# Patient Record
Sex: MALE | Race: WHITE | URBAN - METROPOLITAN AREA
[De-identification: names, ages, dates, MRNs, and addresses within clinical notes are randomized per-mention and may not be internally consistent; named-entity substitution may affect disease eponyms.]

---

## 2017-05-15 ENCOUNTER — OFFICE VISIT (OUTPATIENT)
Dept: ENDOCRINOLOGY | Age: 62
End: 2017-05-15

## 2017-05-15 VITALS
HEART RATE: 79 BPM | SYSTOLIC BLOOD PRESSURE: 112 MMHG | RESPIRATION RATE: 16 BRPM | WEIGHT: 209.8 LBS | BODY MASS INDEX: 31.8 KG/M2 | TEMPERATURE: 97.2 F | DIASTOLIC BLOOD PRESSURE: 64 MMHG | HEIGHT: 68 IN

## 2017-05-15 DIAGNOSIS — R74.01 TRANSAMINITIS: ICD-10-CM

## 2017-05-15 DIAGNOSIS — E78.2 MIXED HYPERLIPIDEMIA: ICD-10-CM

## 2017-05-15 DIAGNOSIS — E03.9 HYPOTHYROIDISM, UNSPECIFIED TYPE: ICD-10-CM

## 2017-05-15 DIAGNOSIS — I10 ESSENTIAL HYPERTENSION: ICD-10-CM

## 2017-05-15 DIAGNOSIS — E11.65 TYPE 2 DIABETES MELLITUS WITH HYPERGLYCEMIA, WITHOUT LONG-TERM CURRENT USE OF INSULIN (HCC): Primary | ICD-10-CM

## 2017-05-15 DIAGNOSIS — E03.4 HYPOTHYROIDISM DUE TO ACQUIRED ATROPHY OF THYROID: ICD-10-CM

## 2017-05-15 DIAGNOSIS — E11.65 UNCONTROLLED TYPE 2 DIABETES MELLITUS WITH HYPERGLYCEMIA, WITHOUT LONG-TERM CURRENT USE OF INSULIN (HCC): ICD-10-CM

## 2017-05-15 RX ORDER — LEVOTHYROXINE SODIUM 200 UG/1
200 TABLET ORAL
Qty: 90 TAB | Refills: 3 | Status: SHIPPED | OUTPATIENT
Start: 2017-05-15 | End: 2017-11-15 | Stop reason: SDUPTHER

## 2017-05-15 RX ORDER — LANCETS
EACH MISCELLANEOUS
Qty: 300 EACH | Refills: 3 | Status: SHIPPED | OUTPATIENT
Start: 2017-05-15 | End: 2017-11-15 | Stop reason: SDUPTHER

## 2017-05-15 RX ORDER — METFORMIN HYDROCHLORIDE 1000 MG/1
1000 TABLET ORAL 2 TIMES DAILY WITH MEALS
Qty: 180 TAB | Refills: 3 | Status: SHIPPED | OUTPATIENT
Start: 2017-05-15 | End: 2017-11-15 | Stop reason: SDUPTHER

## 2017-05-15 RX ORDER — LISINOPRIL 20 MG/1
20 TABLET ORAL DAILY
Qty: 90 TAB | Refills: 3 | Status: SHIPPED | OUTPATIENT
Start: 2017-05-15 | End: 2017-11-15 | Stop reason: SDUPTHER

## 2017-05-15 NOTE — PROGRESS NOTES
[facility]               Tony Goldman MD        [address] 78 [phone] Fax [phone]  [id number]        [address] [facility] [address] YV:[phone] Fax [phone] ( Monday)          Patient Information  Date:5/15/2017  Name : Earl Curiel 65 y.o.     YOB: 1955         Referred by: Jacqui Sosa MD         Chief Complaint   Patient presents with    Diabetes    Thyroid Problem       History of Present Illness: Earl Curiel is a 65 y.o. male here for  Follow-up of  Type 2 Diabetes Mellitus. He was on sulfonylureas which was discontinued due to hypoglycemia. Reports hypoglycemia with Januvia and hence he is off of JAnuvia   onmetformin   LFts mildly abnormal   Lost weight   No issues   Reviewed the log  His BG are at goal           He was referred by Dr. Lashanda Villalobos for the management of type 2 diabetes. He was seen by Dr. Theodore Mensah in 2010 for hyperthyroidism, status post radioactive iodine therapy. He is currently hypothyroid on Levothyroxine replacement. No tingling, numbness. No calf claudication          Wt Readings from Last 3 Encounters:   05/15/17 209 lb 12.8 oz (95.2 kg)   11/14/16 211 lb (95.7 kg)   01/27/16 220 lb (99.8 kg)       BP Readings from Last 3 Encounters:   05/15/17 112/64   11/14/16 (!) 119/95   01/27/16 115/66           Past Medical History:   Diagnosis Date    Diabetes ([facility] [address]     Hyperlipidemia LDL goal < 100     Hyperthyroidism 20 yrs ago    treated with MANZANARES    Hypothyroidism     s/p MANZANARES     Current Outpatient Prescriptions   Medication Sig    metFORMIN (GLUCOPHAGE) 1,000 mg tablet Take 1 Tab by mouth two (2) times daily (with meals).  lisinopril (PRINIVIL, ZESTRIL) 20 mg tablet Take 1 Tab by mouth daily.  levothyroxine (SYNTHROID) 200 mcg tablet Take 1 Tab by mouth Daily (before breakfast).  Except Sunday    Lancets misc Use to test blood sugar 3 times daily Dx Code: E11.65    glucose blood VI test strips (ASCENSIA AUTODISC VI, ONE TOUCH ULTRA TEST VI) strip Use to test blood sugar 3 times daily Dx Code: E11.65    omega-3 fatty acids-vitamin e (FISH OIL) 1,000 mg cap Take 1 Cap by mouth two (2) times a day.  POTASSIUM (POTASSIMIN PO) Take  by mouth.  cholecalciferol, vitamin D3, (VITAMIN D3) 2,000 unit tab Take  by mouth.  aspirin (TONY CHILDRENS ASPIRIN) 81 mg chewable tablet Take 81 mg by mouth daily. Patient states three times per week     No current facility-administered medications for this visit. Allergies   Allergen Reactions    Glyburide Other (comments)     hypoglycemic    Crestor [Rosuvastatin] Nausea Only    Quinolones Other (comments)    Statins-Hmg-Coa Reductase Inhibitors Other (comments)    Neuromuscular Blockers, Steroidal Other (comments)     dizziness         Review of Systems:  - Constitutional Symptoms: no fevers, no chills  - Eyes: no blurry vision no double vision  - Cardiovascular: no chest pain ,no palpitations  - Respiratory: no cough no shortness of breath  - Gastrointestinal: no dysphagia no  abdominal pain  - Musculoskeletal: no joint pains no  weakness  - Integumentary: no rashes  -     Physical Examination:   Blood pressure 112/64, pulse 79, temperature 97.2 °F (36.2 °C), temperature source Oral, resp. rate 16, height 5' 8\" (1.727 m), weight 209 lb 12.8 oz (95.2 kg). Estimated body mass index is 31.9 kg/(m^2) as calculated from the following:    Height as of this encounter: 5' 8\" (1.727 m). -   Weight as of this encounter: 209 lb 12.8 oz (95.2 kg).   - General: pleasant, no distress, good eye contact  - HEENT: no pallor, no periorbital edema, EOMI  - Neck: supple, no thyromegaly  - Cardiovascular: regular, normal rate, normal S1 and S2  - Respiratory: clear to auscultation bilaterally  - Foot no ulcers , no callus , Xerosis  - Gastrointestinal: soft, nontender, nondistended,  BS +  - Musculoskeletal:  no edema, no foot ulcers  -       Data Reviewed:     [] Glucose records reviewed. [] See glucose records for details (to be scanned). [x] A1C  [x] Reviewed labs    Lab Results   Component Value Date/Time    Hemoglobin A1c 6.7 05/08/2017 08:16 AM    Hemoglobin A1c 6.5 08/15/2016 08:34 AM    Hemoglobin A1c 7.1 01/20/2016 08:14 AM    Glucose 118 05/08/2017 08:16 AM    Glucose POC 98 06/05/2014 09:35 AM    Microalb/Creat ratio (ug/mg creat.) 43.6 05/08/2017 08:16 AM    LDL, calculated 85 05/08/2017 08:16 AM    Creatinine 0.71 05/08/2017 08:16 AM      Lab Results   Component Value Date/Time    Cholesterol, total 148 05/08/2017 08:16 AM    HDL Cholesterol 47 05/08/2017 08:16 AM    LDL, calculated 85 05/08/2017 08:16 AM    Triglyceride 79 05/08/2017 08:16 AM     Lab Results   Component Value Date/Time    ALT (SGPT) 58 05/08/2017 08:16 AM    AST (SGOT) 23 05/08/2017 08:16 AM    Alk. phosphatase 80 05/08/2017 08:16 AM    Bilirubin, total 0.3 05/08/2017 08:16 AM     Lab Results   Component Value Date/Time    GFR est  05/08/2017 08:16 AM    GFR est non- 05/08/2017 08:16 AM    Creatinine 0.71 05/08/2017 08:16 AM    BUN 19 05/08/2017 08:16 AM    Sodium 138 05/08/2017 08:16 AM    Potassium 4.5 05/08/2017 08:16 AM    Chloride 102 05/08/2017 08:16 AM    CO2 22 05/08/2017 08:16 AM      Lab Results   Component Value Date/Time    TSH 0.438 05/08/2017 08:16 AM        Assessment/Plan:     1. Type 2 Diabetes Mellitus with no known complications  Lab Results   Component Value Date/Time    Hemoglobin A1c 6.7 05/08/2017 08:16 AM    Hemoglobin A1c (POC) 6.1 06/05/2014 09:35 AM   controlled   Continue Metformin  off januvia  FLU annually ,Pneumovax ,aspirin daily,annual eye exam,microalbumin    2. HTN : Continue current therapy . 3. Post ablative hypothyroidism-continue levothyroxine,none  on LT4 on Sunday     4. Obesity:Body mass index is 31.9 kg/(m^2).     5 Hyperlipidemia - diet controlled  Refused statin - understands the benefits      6 Transaminitis - NAFLD  Vit E  Losing weight       Thank you for allowing me to participate in the care of this patient.     Deana Howard MD

## 2017-05-15 NOTE — PROGRESS NOTES
Devonte Sen is a 65 y.o. male here for   Chief Complaint   Patient presents with    Diabetes    Thyroid Problem       Functional glucose monitor and record keeping system? - yes  Eye exam within last year? - yes Jan 2017  Foot exam within last year? - yes    Lab Results   Component Value Date/Time    Hemoglobin A1c 6.7 05/08/2017 08:16 AM    Hemoglobin A1c (POC) 6.1 06/05/2014 09:35 AM       Wt Readings from Last 3 Encounters:   11/14/16 211 lb (95.7 kg)   01/27/16 220 lb (99.8 kg)   09/30/15 216 lb 3.2 oz (98.1 kg)     Temp Readings from Last 3 Encounters:   11/14/16 96 °F (35.6 °C) (Oral)   09/30/15 97.6 °F (36.4 °C) (Oral)   05/13/15 97.6 °F (36.4 °C) (Oral)     BP Readings from Last 3 Encounters:   11/14/16 (!) 119/95   01/27/16 115/66   09/30/15 105/62     Pulse Readings from Last 3 Encounters:   11/14/16 73   01/27/16 76   09/30/15 66

## 2017-05-15 NOTE — MR AVS SNAPSHOT
Visit Information Date & Time Provider Department Dept. Phone Encounter #  
 5/15/2017  9:45 AM Amber Cee MD [facility] [phone] [id number] Follow-up Instructions Return in about 6 months (around 11/15/2017). Upcoming Health Maintenance Date Due Hepatitis C Screening 1955 FOOT EXAM Q1 4/29/1966 EYE EXAM RETINAL OR DILATED Q1 4/29/1966 Pneumococcal 19-64 Medium Risk (1 of 1 - PPSV23) 4/29/1975 DTaP/Tdap/Td series (1 - Tdap) 4/29/1977 FOBT Q 1 YEAR AGE 50-75 4/29/2006 ZOSTER VACCINE AGE 60> 4/29/2016 INFLUENZA AGE 9 TO ADULT 8/1/2017 HEMOGLOBIN A1C Q6M 11/8/2017 MICROALBUMIN Q1 5/8/2018 LIPID PANEL Q1 5/8/2018 Allergies as of 5/15/2017  Review Complete On: 5/15/2017 By: Amber Cee MD  
  
 Severity Noted Reaction Type Reactions Glyburide High 08/03/2010   Side Effect Other (comments)  
 hypoglycemic Crestor [Rosuvastatin] Medium 08/03/2010   Side Effect Nausea Only Quinolones Medium 08/03/2010   Side Effect Other (comments) Statins-hmg-coa Reductase Inhibitors Medium 08/03/2010   Side Effect Other (comments) Neuromuscular Blockers, Steroidal  08/03/2010   Side Effect Other (comments)  
 dizziness Current Immunizations  Never Reviewed No immunizations on file. Not reviewed this visit You Were Diagnosed With   
  
 Codes Comments Type 2 diabetes mellitus with hyperglycemia, without long-term current use of insulin (HCC)    -  Primary ICD-10-CM: E11.65 ICD-9-CM: 250.00, 790.29 Hypothyroidism due to acquired atrophy of thyroid     ICD-10-CM: E03.4 ICD-9-CM: 244.8, 246.8 Vitals BP Pulse Temp Resp Height(growth percentile) Weight(growth percentile) 112/64 (BP 1 Location: Right arm, BP Patient Position: Sitting) 79 97.2 °F (36.2 °C) (Oral) 16 5' 8\" (1.727 m) 209 lb 12.8 oz (95.2 kg) BMI  
  
  
  
  
 31.9 kg/m2 Vitals History BMI and BSA Data Body Mass Index Body Surface Area 31.9 kg/m 2 2.14 m 2 Preferred Pharmacy Pharmacy Name Phone [facility] [address] Your Updated Medication List  
  
   
This list is accurate as of: 5/15/17 10:58 AM.  Always use your most recent med list.  
  
  
  
  
 TONY CHILDRENS ASPIRIN 81 mg chewable tablet Generic drug:  aspirin Take 81 mg by mouth daily. Patient states three times per week FISH OIL 1,000 mg Cap Generic drug:  omega-3 fatty acids-vitamin e Take 1 Cap by mouth two (2) times a day. glucose blood VI test strips strip Commonly known as:  ASCENSIA AUTODISC VI, ONE TOUCH ULTRA TEST VI  
Use to test blood sugar 3 times daily Dx Code: E11.65 Lancets Misc Use to test blood sugar 3 times daily Dx Code: E11.65  
  
 levothyroxine 200 mcg tablet Commonly known as:  SYNTHROID Take 1 Tab by mouth Daily (before breakfast). Except Sunday  
  
 lisinopril 20 mg tablet Commonly known as:  Dorean Peeks Take 1 Tab by mouth daily. metFORMIN 1,000 mg tablet Commonly known as:  GLUCOPHAGE Take 1 Tab by mouth two (2) times daily (with meals). POTASSIMIN PO Take  by mouth. VITAMIN D3 2,000 unit Tab Generic drug:  cholecalciferol (vitamin D3) Take  by mouth. Follow-up Instructions Return in about 6 months (around 11/15/2017). Introducing [facility]! Tesha Ram introduces [employer] patient portal. Now you can access parts of your medical record, email your doctor's office, and request medication refills online. 1. In your internet browser, go to [email or web address] 2. Click on the First Time User? Click Here link in the Sign In box. You will see the New Member Sign Up page. 3. Enter your [employer] Access Code exactly as it appears below. You will not need to use this code after youve completed the sign-up process.  If you do not sign up before the expiration date, you must request a new code. · [employer] Access Code: [email or web address] Expires: 8/13/2017 10:58 AM 
 
4. Enter the last four digits of your Social Security Number (xxxx) and Date of Birth (mm/dd/yyyy) as indicated and click Submit. You will be taken to the next sign-up page. 5. Create a Event[employer]q ID. This will be your Event[employer] login ID and cannot be changed, so think of one that is secure and easy to remember. 6. Create a [employer] password. You can change your password at any time. 7. Enter your Password Reset Question and Answer. This can be used at a later time if you forget your password. 8. Enter your e-mail address. You will receive e-mail notification when new information is available in [address] 9. Click Sign Up. You can now view and download portions of your medical record. 10. Click the Download Summary menu link to download a portable copy of your medical information. If you have questions, please visit the Frequently Asked Questions section of the [employer] website. Remember, [employer] is NOT to be used for urgent needs. For medical emergencies, dial 911. Now available from your iPhone and Android! Please provide this summary of care documentation to your next provider. Your primary care clinician is listed as Michael Wright. If you have any questions after today's visit, please call [phone].

## 2017-08-18 DIAGNOSIS — E11.65 TYPE 2 DIABETES MELLITUS WITH HYPERGLYCEMIA, UNSPECIFIED LONG TERM INSULIN USE STATUS: Primary | ICD-10-CM

## 2017-08-18 NOTE — TELEPHONE ENCOUNTER
Pt called stating that his sugars are running around 130+ in the morning when fasting and over 200 after eating. He says it usually runs only about 120 or lower fasting and about 150 after he eats breakfast. He wants to know if anything needs to be done or if he can be put on another rx.

## 2017-08-18 NOTE — TELEPHONE ENCOUNTER
Pt states he has not been sick, had an infection or had any steroids. Informed pt he needs Januvia 100 mg every AM. Pt states send rx to [facility].

## 2017-11-15 ENCOUNTER — OFFICE VISIT (OUTPATIENT)
Dept: ENDOCRINOLOGY | Age: 62
End: 2017-11-15

## 2017-11-15 VITALS
BODY MASS INDEX: 32.74 KG/M2 | DIASTOLIC BLOOD PRESSURE: 61 MMHG | HEART RATE: 77 BPM | RESPIRATION RATE: 16 BRPM | SYSTOLIC BLOOD PRESSURE: 134 MMHG | OXYGEN SATURATION: 96 % | HEIGHT: 68 IN | WEIGHT: 216 LBS | TEMPERATURE: 96.2 F

## 2017-11-15 DIAGNOSIS — E11.65 UNCONTROLLED TYPE 2 DIABETES MELLITUS WITH HYPERGLYCEMIA, WITHOUT LONG-TERM CURRENT USE OF INSULIN (HCC): ICD-10-CM

## 2017-11-15 DIAGNOSIS — I10 ESSENTIAL HYPERTENSION: ICD-10-CM

## 2017-11-15 DIAGNOSIS — E11.65 TYPE 2 DIABETES MELLITUS WITH HYPERGLYCEMIA, UNSPECIFIED LONG TERM INSULIN USE STATUS: ICD-10-CM

## 2017-11-15 DIAGNOSIS — E03.9 HYPOTHYROIDISM, UNSPECIFIED TYPE: ICD-10-CM

## 2017-11-15 DIAGNOSIS — E11.65 TYPE 2 DIABETES MELLITUS WITH HYPERGLYCEMIA, WITHOUT LONG-TERM CURRENT USE OF INSULIN (HCC): Primary | ICD-10-CM

## 2017-11-15 DIAGNOSIS — E78.2 MIXED HYPERLIPIDEMIA: ICD-10-CM

## 2017-11-15 DIAGNOSIS — E03.4 HYPOTHYROIDISM DUE TO ACQUIRED ATROPHY OF THYROID: ICD-10-CM

## 2017-11-15 RX ORDER — METFORMIN HYDROCHLORIDE 1000 MG/1
1000 TABLET ORAL 2 TIMES DAILY WITH MEALS
Qty: 180 TAB | Refills: 3 | Status: SHIPPED | OUTPATIENT
Start: 2017-11-15 | End: 2018-05-09 | Stop reason: SDUPTHER

## 2017-11-15 RX ORDER — LISINOPRIL 20 MG/1
20 TABLET ORAL DAILY
Qty: 90 TAB | Refills: 3 | Status: SHIPPED | OUTPATIENT
Start: 2017-11-15 | End: 2018-05-09 | Stop reason: SDUPTHER

## 2017-11-15 RX ORDER — LANCETS
EACH MISCELLANEOUS
Qty: 300 EACH | Refills: 3 | Status: SHIPPED | OUTPATIENT
Start: 2017-11-15 | End: 2018-05-09 | Stop reason: SDUPTHER

## 2017-11-15 RX ORDER — VITAMIN E CAP 100 UNIT 100 UNIT
100 CAP ORAL EVERY OTHER DAY
COMMUNITY
End: 2021-08-23

## 2017-11-15 RX ORDER — LEVOTHYROXINE SODIUM 200 UG/1
200 TABLET ORAL
Qty: 90 TAB | Refills: 3 | Status: SHIPPED | OUTPATIENT
Start: 2017-11-15 | End: 2018-05-09 | Stop reason: SDUPTHER

## 2017-11-15 NOTE — MR AVS SNAPSHOT
Visit Information Date & Time Provider Department Dept. Phone Encounter #  
 11/15/2017  9:30 AM Dalia Nash MD [facility] [phone] [id number] Follow-up Instructions Return in about 6 months (around 5/15/2018). Upcoming Health Maintenance Date Due  
 FOOT EXAM Q1 4/29/1966 EYE EXAM RETINAL OR DILATED Q1 4/29/1966 Pneumococcal 19-64 Medium Risk (1 of 1 - PPSV23) 4/29/1975 DTaP/Tdap/Td series (1 - Tdap) 4/29/1977 FOBT Q 1 YEAR AGE 50-75 4/29/2006 ZOSTER VACCINE AGE 60> 2/29/2016 Influenza Age 5 to Adult 8/1/2017 HEMOGLOBIN A1C Q6M 5/8/2018 MICROALBUMIN Q1 5/8/2018 LIPID PANEL Q1 5/8/2018 Allergies as of 11/15/2017  Review Complete On: 5/15/2017 By: Dalia Nash MD  
  
 Severity Noted Reaction Type Reactions Glyburide High 08/03/2010   Side Effect Other (comments)  
 hypoglycemic Crestor [Rosuvastatin] Medium 08/03/2010   Side Effect Nausea Only Quinolones Medium 08/03/2010   Side Effect Other (comments) Statins-hmg-coa Reductase Inhibitors Medium 08/03/2010   Side Effect Other (comments) Neuromuscular Blockers, Steroidal  08/03/2010   Side Effect Other (comments)  
 dizziness Current Immunizations  Never Reviewed No immunizations on file. Not reviewed this visit You Were Diagnosed With   
  
 Codes Comments Type 2 diabetes mellitus with hyperglycemia, without long-term current use of insulin (HCC)    -  Primary ICD-10-CM: E11.65 ICD-9-CM: 250.00, 790.29 Mixed hyperlipidemia     ICD-10-CM: E78.2 ICD-9-CM: 272.2 Hypothyroidism due to acquired atrophy of thyroid     ICD-10-CM: E03.4 ICD-9-CM: 244.8, 246.8 Vitals BP Pulse Temp Resp Height(growth percentile) Weight(growth percentile) 134/61 (BP 1 Location: Right arm, BP Patient Position: Sitting) 77 96.2 °F (35.7 °C) (Oral) 16 5' 8\" (1.727 m) 216 lb (98 kg) SpO2 BMI 96% 32.84 kg/m2 Vitals History BMI and BSA Data Body Mass Index Body Surface Area  
 32.84 kg/m 2 2.17 m 2 Preferred Pharmacy Pharmacy Name Phone [facility] [address] Your Updated Medication List  
  
   
This list is accurate as of: 11/15/17  9:59 AM.  Always use your most recent med list.  
  
  
  
  
 TONY CHILDRENS ASPIRIN 81 mg chewable tablet Generic drug:  aspirin Take 81 mg by mouth daily. Patient states three times per week FISH OIL 1,000 mg Cap Generic drug:  omega-3 fatty acids-vitamin e Take 1 Cap by mouth two (2) times a day. glucose blood VI test strips strip Commonly known as:  ASCENSIA AUTODISC VI, ONE TOUCH ULTRA TEST VI  
Use to test blood sugar 3 times daily Dx Code: E11.65 Lancets Misc Use to test blood sugar 3 times daily Dx Code: E11.65  
  
 levothyroxine 200 mcg tablet Commonly known as:  SYNTHROID Take 1 Tab by mouth Daily (before breakfast). Except Sunday  
  
 lisinopril 20 mg tablet Commonly known as:  Lorena Maddox Take 1 Tab by mouth daily. metFORMIN 1,000 mg tablet Commonly known as:  GLUCOPHAGE Take 1 Tab by mouth two (2) times daily (with meals). POTASSIMIN PO Take  by mouth. SITagliptin 100 mg tablet Commonly known as:  Irene Sine Take 1 Tab by mouth every morning. VITAMIN D3 2,000 unit Tab Generic drug:  cholecalciferol (vitamin D3) Take  by mouth.  
  
 vitamin e 100 unit capsule Commonly known as:  E GEMS Take 100 Units by mouth every other day. Follow-up Instructions Return in about 6 months (around 5/15/2018). Introducing [facility]! Salina Bueno introduces [facility] [employer] patient portal. Now you can access parts of your medical record, email your doctor's office, and request medication refills online. 1. In your internet browser, go to [email or web address] 2. Click on the First Time User? Click Here link in the Sign In box. You will see the New Member Sign Up page. 3. Enter your [email or web address] Access Code exactly as it appears below. You will not need to use this code after youve completed the sign-up process. If you do not sign up before the expiration date, you must request a new code. · [email or web address] Access Code: [email or web address] Expires: 2/13/2018  9:59 AM 
 
4. Enter the last four digits of your Social Security Number (xxxx) and Date of Birth (mm/dd/yyyy) as indicated and click Submit. You will be taken to the next sign-up page. 5. Create a [email or web address] ID. This will be your [email or web address] login ID and cannot be changed, so think of one that is secure and easy to remember. 6. Create a [email or web address] password. You can change your password at any time. 7. Enter your Password Reset Question and Answer. This can be used at a later time if you forget your password. 8. Enter your e-mail address. You will receive e-mail notification when new information is available in [address] 9. Click Sign Up. You can now view and download portions of your medical record. 10. Click the Download Summary menu link to download a portable copy of your medical information. If you have questions, please visit the Frequently Asked Questions section of the [email or web address] website. Remember, [email or web address] is NOT to be used for urgent needs. For medical emergencies, dial 911. Now available from your iPhone and Android! Please provide this summary of care documentation to your next provider. Your primary care clinician is listed as Patricio Sanchez. If you have any questions after today's visit, please call [phone].

## 2017-11-15 NOTE — PROGRESS NOTES
Chintan Guallpa is a 65 y.o. male here for   Chief Complaint   Patient presents with    Diabetes    Thyroid Problem       Functional glucose monitor and record keeping system? -yes   Eye exam within last year? - Jan 2017  Foot exam within last year? - yes    1. Have you been to the ER, urgent care clinic since your last visit? Hospitalized since your last visit? -no    2. Have you seen or consulted any other health care providers outside of the [facility] since your last visit? Include any pap smears or colon screening. -PCP      Lab Results   Component Value Date/Time    Hemoglobin A1c 6.5 11/08/2017 08:07 AM    Hemoglobin A1c (POC) 6.1 06/05/2014 09:35 AM       Wt Readings from Last 3 Encounters:   05/15/17 209 lb 12.8 oz (95.2 kg)   11/14/16 211 lb (95.7 kg)   01/27/16 220 lb (99.8 kg)     Temp Readings from Last 3 Encounters:   05/15/17 97.2 °F (36.2 °C) (Oral)   11/14/16 96 °F (35.6 °C) (Oral)   09/30/15 97.6 °F (36.4 °C) (Oral)     BP Readings from Last 3 Encounters:   05/15/17 112/64   11/14/16 (!) 119/95   01/27/16 115/66     Pulse Readings from Last 3 Encounters:   05/15/17 79   11/14/16 73   01/27/16 76

## 2017-11-15 NOTE — PROGRESS NOTES
Jer Concepcion MD          Patient Information  Date:11/15/2017  Name : Lilian Vaughna 65 y.o.     YOB: 1955         Referred by: Feroz Camarena MD         Chief Complaint   Patient presents with    Diabetes    Thyroid Problem       History of Present Illness: Lilian Vaughan is a 65 y.o. male here for  Follow-up of  Type 2 Diabetes Mellitus. He was on sulfonylureas which was discontinued due to hypoglycemia. on januvia and metformin   LFts mildly abnormal     Reviewed the log  His BG are at goal           He was referred by Dr. Karsten Knight for the management of type 2 diabetes. He was seen by Dr. Shar Goetz in 2010 for hyperthyroidism, status post radioactive iodine therapy. He is currently hypothyroid on Levothyroxine replacement. No tingling, numbness. No calf claudication          Wt Readings from Last 3 Encounters:   11/15/17 216 lb (98 kg)   05/15/17 209 lb 12.8 oz (95.2 kg)   11/14/16 211 lb (95.7 kg)       BP Readings from Last 3 Encounters:   11/15/17 134/61   05/15/17 112/64   11/14/16 (!) 119/95           Past Medical History:   Diagnosis Date    [facility]     Hyperlipidemia LDL goal < 100     Hyperthyroidism 20 yrs ago    treated with MANZANARES    Hypothyroidism     s/p MANZANARES     Current Outpatient Prescriptions   Medication Sig    vitamin e (E GEMS) 100 unit capsule Take 100 Units by mouth every other day.  SITagliptin (JANUVIA) 100 mg tablet Take 1 Tab by mouth every morning.  metFORMIN (GLUCOPHAGE) 1,000 mg tablet Take 1 Tab by mouth two (2) times daily (with meals).  lisinopril (PRINIVIL, ZESTRIL) 20 mg tablet Take 1 Tab by mouth daily.  levothyroxine (SYNTHROID) 200 mcg tablet Take 1 Tab by mouth Daily (before breakfast).  Except Sunday    Lancets misc Use to test blood sugar 3 times daily Dx Code: E11.65    glucose blood VI test strips (ASCENSIA AUTODISC VI, ONE TOUCH ULTRA TEST VI) strip Use to test blood sugar 3 times daily Dx Code: E11.65    omega-3 fatty acids-vitamin e (FISH OIL) 1,000 mg cap Take 1 Cap by mouth two (2) times a day.  POTASSIUM (POTASSIMIN PO) Take  by mouth.  cholecalciferol, vitamin D3, (VITAMIN D3) 2,000 unit tab Take  by mouth.  aspirin (TONY CHILDRENS ASPIRIN) 81 mg chewable tablet Take 81 mg by mouth daily. Patient states three times per week     No current facility-administered medications for this visit. Allergies   Allergen Reactions    Glyburide Other (comments)     hypoglycemic    Crestor [Rosuvastatin] Nausea Only    Quinolones Other (comments)    Statins-Hmg-Coa Reductase Inhibitors Other (comments)    Neuromuscular Blockers, Steroidal Other (comments)     dizziness         Review of Systems:  - Constitutional Symptoms: no fevers, no chills  - Eyes: no blurry vision no double vision  - Cardiovascular: no chest pain ,no palpitations  - Respiratory: no cough no shortness of breath  - Gastrointestinal: no dysphagia no  abdominal pain  - Musculoskeletal: no joint pains no  weakness  - Integumentary: no rashes  -     Physical Examination:   Blood pressure 134/61, pulse 77, temperature 96.2 °F (35.7 °C), temperature source Oral, resp. rate 16, height 5' 8\" (1.727 m), weight 216 lb (98 kg), SpO2 96 %. Estimated body mass index is 32.84 kg/(m^2) as calculated from the following:    Height as of this encounter: 5' 8\" (1.727 m). -   Weight as of this encounter: 216 lb (98 kg). - General: pleasant, no distress, good eye contact  - HEENT: no pallor, no periorbital edema, EOMI  - Neck: supple, no thyromegaly  - Cardiovascular: regular, normal rate, normal S1 and S2, murmur  - Respiratory: clear to auscultation bilaterally  - Foot no ulcers , no callus , Xerosis  - Gastrointestinal: soft, nontender, nondistended,  BS +  - Musculoskeletal:  no edema, no foot ulcers  -       Data Reviewed:     [] Glucose records reviewed.   [] See glucose records for details (to be scanned). [x] A1C  [x] Reviewed labs    Lab Results   Component Value Date/Time    Hemoglobin A1c 6.5 11/08/2017 08:07 AM    Hemoglobin A1c 6.7 05/08/2017 08:16 AM    Hemoglobin A1c 6.5 08/15/2016 08:34 AM    Glucose 126 11/08/2017 08:07 AM    Glucose POC 98 06/05/2014 09:35 AM    Microalb/Creat ratio (ug/mg creat.) 43.6 05/08/2017 08:16 AM    LDL, calculated 85 05/08/2017 08:16 AM    Creatinine 0.69 11/08/2017 08:07 AM      Lab Results   Component Value Date/Time    Cholesterol, total 148 05/08/2017 08:16 AM    HDL Cholesterol 47 05/08/2017 08:16 AM    LDL, calculated 85 05/08/2017 08:16 AM    Triglyceride 79 05/08/2017 08:16 AM     Lab Results   Component Value Date/Time    ALT (SGPT) 54 11/08/2017 08:07 AM    AST (SGOT) 32 11/08/2017 08:07 AM    Alk. phosphatase 86 11/08/2017 08:07 AM    Bilirubin, total 0.2 11/08/2017 08:07 AM     Lab Results   Component Value Date/Time    GFR est  11/08/2017 08:07 AM    GFR est non- 11/08/2017 08:07 AM    Creatinine 0.69 11/08/2017 08:07 AM    BUN 13 11/08/2017 08:07 AM    Sodium 139 11/08/2017 08:07 AM    Potassium 5.1 11/08/2017 08:07 AM    Chloride 98 11/08/2017 08:07 AM    CO2 28 11/08/2017 08:07 AM      Lab Results   Component Value Date/Time    TSH 1.030 11/08/2017 08:07 AM        Assessment/Plan:     1. Type 2 Diabetes Mellitus with no known complications  Lab Results   Component Value Date/Time    Hemoglobin A1c 6.5 11/08/2017 08:07 AM    Hemoglobin A1c (POC) 6.1 06/05/2014 09:35 AM   controlled   Continue Metformin  on januvia  FLU annually ,Pneumovax ,aspirin daily,annual eye exam,microalbumin    2. HTN : Continue current therapy . 3. Post ablative hypothyroidism-continue levothyroxine,none  on LT4 on Sunday     4. Obesity:Body mass index is 32.84 kg/(m^2).     5 Hyperlipidemia - diet controlled  Refused statin - understands the benefits      6 Transaminitis - NAFLD  Vit E  Hepatitis panel negative      Thank you for allowing me to participate in the care of this patient.     Марина Otero MD

## 2018-04-11 ENCOUNTER — TELEPHONE (OUTPATIENT)
Dept: ENDOCRINOLOGY | Age: 63
End: 2018-04-11

## 2018-04-11 NOTE — TELEPHONE ENCOUNTER
Patient came by office and was given a new Januvia savings card. Expiration date is 2019. Informed patient to activate card before taking it to the pharmacy. Verbalized understanding.

## 2018-05-09 ENCOUNTER — OFFICE VISIT (OUTPATIENT)
Dept: ENDOCRINOLOGY | Age: 63
End: 2018-05-09

## 2018-05-09 VITALS
HEIGHT: 68 IN | RESPIRATION RATE: 16 BRPM | HEART RATE: 65 BPM | SYSTOLIC BLOOD PRESSURE: 117 MMHG | BODY MASS INDEX: 31.75 KG/M2 | DIASTOLIC BLOOD PRESSURE: 62 MMHG | OXYGEN SATURATION: 98 % | WEIGHT: 209.5 LBS | TEMPERATURE: 96.3 F

## 2018-05-09 DIAGNOSIS — E78.2 MIXED HYPERLIPIDEMIA: ICD-10-CM

## 2018-05-09 DIAGNOSIS — I10 ESSENTIAL HYPERTENSION: ICD-10-CM

## 2018-05-09 DIAGNOSIS — E11.65 TYPE 2 DIABETES MELLITUS WITH HYPERGLYCEMIA, WITHOUT LONG-TERM CURRENT USE OF INSULIN (HCC): Primary | ICD-10-CM

## 2018-05-09 DIAGNOSIS — E11.65 UNCONTROLLED TYPE 2 DIABETES MELLITUS WITH HYPERGLYCEMIA, WITHOUT LONG-TERM CURRENT USE OF INSULIN (HCC): ICD-10-CM

## 2018-05-09 DIAGNOSIS — E03.9 HYPOTHYROIDISM, UNSPECIFIED TYPE: ICD-10-CM

## 2018-05-09 DIAGNOSIS — E03.4 HYPOTHYROIDISM DUE TO ACQUIRED ATROPHY OF THYROID: ICD-10-CM

## 2018-05-09 PROBLEM — E11.21 TYPE 2 DIABETES WITH NEPHROPATHY (HCC): Status: ACTIVE | Noted: 2018-05-09

## 2018-05-09 RX ORDER — LANCETS
EACH MISCELLANEOUS
Qty: 300 EACH | Refills: 3 | Status: SHIPPED | OUTPATIENT
Start: 2018-05-09 | End: 2018-11-13 | Stop reason: SDUPTHER

## 2018-05-09 RX ORDER — LEVOTHYROXINE SODIUM 200 UG/1
200 TABLET ORAL
Qty: 90 TAB | Refills: 3 | Status: SHIPPED | OUTPATIENT
Start: 2018-05-09 | End: 2018-11-13 | Stop reason: SDUPTHER

## 2018-05-09 RX ORDER — METFORMIN HYDROCHLORIDE 1000 MG/1
1000 TABLET ORAL 2 TIMES DAILY WITH MEALS
Qty: 180 TAB | Refills: 3 | Status: SHIPPED | OUTPATIENT
Start: 2018-05-09 | End: 2018-11-13 | Stop reason: SDUPTHER

## 2018-05-09 RX ORDER — LISINOPRIL 20 MG/1
20 TABLET ORAL DAILY
Qty: 90 TAB | Refills: 3 | Status: SHIPPED | OUTPATIENT
Start: 2018-05-09 | End: 2018-11-13 | Stop reason: SDUPTHER

## 2018-05-09 NOTE — PROGRESS NOTES
Kaity Cruz MD          Patient Information  Date:5/9/2018  Name : Boyd Zarate 59 y.o.     YOB: 1955         Referred by: Jaylene Rubio MD         Chief Complaint   Patient presents with    Diabetes    Thyroid Problem       History of Present Illness: Boyd Zarate is a 59 y.o. male here for  Follow-up of  Type 2 Diabetes Mellitus. DM dx 2006   He was on sulfonylureas which was discontinued due to hypoglycemia. on januvia and metformin   LFts mildly abnormal     Checking glucose at home  Has not noticed significant hypoglycemia     Compliant with medications    No acute issues            He was referred by Dr. Jolly Hammond for the management of type 2 diabetes. He was seen by Dr. Shawn Terry in 2010 for hyperthyroidism, status post radioactive iodine therapy. He is currently hypothyroid on Levothyroxine replacement. No tingling, numbness. No calf claudication          Wt Readings from Last 3 Encounters:   05/09/18 209 lb 8 oz (95 kg)   11/15/17 216 lb (98 kg)   05/15/17 209 lb 12.8 oz (95.2 kg)       BP Readings from Last 3 Encounters:   05/09/18 117/62   11/15/17 134/61   05/15/17 112/64           Past Medical History:   Diagnosis Date    [facility]     Hyperlipidemia LDL goal < 100     Hyperthyroidism 20 yrs ago    treated with MANZANARES    Hypothyroidism     s/p MANZANARES     Current Outpatient Prescriptions   Medication Sig    vitamin e (E GEMS) 100 unit capsule Take 100 Units by mouth every other day.  SITagliptin (JANUVIA) 100 mg tablet Take 1 Tab by mouth every morning.  metFORMIN (GLUCOPHAGE) 1,000 mg tablet Take 1 Tab by mouth two (2) times daily (with meals).  lisinopril (PRINIVIL, ZESTRIL) 20 mg tablet Take 1 Tab by mouth daily.  levothyroxine (SYNTHROID) 200 mcg tablet Take 1 Tab by mouth Daily (before breakfast).  Except Sunday    glucose blood VI test strips (ASCENSIA AUTODISC VI, ONE TOUCH ULTRA TEST VI) strip Use to test blood sugar 3 times daily Dx Code: E11.65    Lancets misc Use to test blood sugar 3 times daily Dx Code: E11.65    omega-3 fatty acids-vitamin e (FISH OIL) 1,000 mg cap Take 1 Cap by mouth two (2) times a day.  POTASSIUM (POTASSIMIN PO) Take  by mouth.  cholecalciferol, vitamin D3, (VITAMIN D3) 2,000 unit tab Take  by mouth.  aspirin (TONY CHILDRENS ASPIRIN) 81 mg chewable tablet Take 81 mg by mouth daily. Patient states three times per week     No current facility-administered medications for this visit. Allergies   Allergen Reactions    Glyburide Other (comments)     hypoglycemic    Crestor [Rosuvastatin] Nausea Only    Quinolones Other (comments)    Statins-Hmg-Coa Reductase Inhibitors Other (comments)    Neuromuscular Blockers, Steroidal Other (comments)     dizziness         Review of Systems:  - Constitutional Symptoms: no fevers, no chills  - Eyes: no blurry vision no double vision  - Cardiovascular: no chest pain ,no palpitations  - Respiratory: no cough no shortness of breath  - Gastrointestinal: no dysphagia no  abdominal pain  - Musculoskeletal: no joint pains no  weakness  - Integumentary: no rashes  -     Physical Examination:   Blood pressure 117/62, pulse 65, temperature 96.3 °F (35.7 °C), temperature source Oral, resp. rate 16, height 5' 8\" (1.727 m), weight 209 lb 8 oz (95 kg), SpO2 98 %. Estimated body mass index is 31.85 kg/(m^2) as calculated from the following:    Height as of this encounter: 5' 8\" (1.727 m). -   Weight as of this encounter: 209 lb 8 oz (95 kg).   - General: pleasant, no distress, good eye contact  - HEENT: no pallor, no periorbital edema, EOMI  - Neck: supple, no thyromegaly  - Cardiovascular: regular, normal rate, normal S1 and S2, murmur  - Respiratory: clear to auscultation bilaterally  - Gastrointestinal: soft, nontender, nondistended,  BS +  - Musculoskeletal:  no edema,  -   Diabetic foot exam: May 2018    Left: Vibratory sensation normal    Filament test normal sensation with micro filament   Pulse DP: 1+    Deformities: None  Right:    Vibratory sensation normal   Filament test normal sensation with micro filament   Pulse DP: 1+   Deformities: None      Data Reviewed:     [] Glucose records reviewed. [] See glucose records for details (to be scanned). [x] A1C  [x] Reviewed labs    Lab Results   Component Value Date/Time    Hemoglobin A1c 6.4 (H) 05/02/2018 08:24 AM    Hemoglobin A1c 6.5 (H) 11/08/2017 08:07 AM    Hemoglobin A1c 6.7 (H) 05/08/2017 08:16 AM    Glucose 135 (H) 05/02/2018 08:24 AM    Glucose POC 98 06/05/2014 09:35 AM    Microalb/Creat ratio (ug/mg creat.) 50.4 (H) 05/02/2018 08:24 AM    LDL,Direct 97 05/02/2018 08:24 AM    LDL, calculated 85 05/08/2017 08:16 AM    Creatinine 0.67 (L) 05/02/2018 08:24 AM      Lab Results   Component Value Date/Time    Cholesterol, total 148 05/08/2017 08:16 AM    HDL Cholesterol 47 05/08/2017 08:16 AM    LDL,Direct 97 05/02/2018 08:24 AM    LDL, calculated 85 05/08/2017 08:16 AM    Triglyceride 79 05/08/2017 08:16 AM     Lab Results   Component Value Date/Time    ALT (SGPT) 47 (H) 05/02/2018 08:24 AM    AST (SGOT) 22 05/02/2018 08:24 AM    Alk. phosphatase 81 05/02/2018 08:24 AM    Bilirubin, total 0.3 05/02/2018 08:24 AM     Lab Results   Component Value Date/Time    GFR est  05/02/2018 08:24 AM    GFR est non- 05/02/2018 08:24 AM    Creatinine 0.67 (L) 05/02/2018 08:24 AM    BUN 19 05/02/2018 08:24 AM    Sodium 138 05/02/2018 08:24 AM    Potassium 5.0 05/02/2018 08:24 AM    Chloride 100 05/02/2018 08:24 AM    CO2 21 05/02/2018 08:24 AM      Lab Results   Component Value Date/Time    TSH 1.030 11/08/2017 08:07 AM        Assessment/Plan:     1.  Type 2 Diabetes Mellitus with no known complications  Lab Results   Component Value Date/Time    Hemoglobin A1c 6.4 (H) 05/02/2018 08:24 AM    Hemoglobin A1c (POC) 6.1 06/05/2014 09:35 AM   controlled   Continue Metformin  on januvia  FLU annually ,Pneumovax ,aspirin daily,annual eye exam,microalbumin    2. HTN : Continue current therapy . 3. Post ablative hypothyroidism-continue levothyroxine,none  on LT4 on Sunday     4. Obesity:Body mass index is 31.85 kg/(m^2). 5 Hyperlipidemia - diet controlled  Refused statin - understands the benefits      6 Transaminitis - NAFLD  Vit E  Hepatitis panel negative      Thank you for allowing me to participate in the care of this patient.     Yaya Almonte MD

## 2018-05-09 NOTE — PATIENT INSTRUCTIONS
Please remember to bring your meter and/or log to every visit. Also, be sure to have a dilated diabetic eye exam done annually. Refills -Please call your pharmacy and have them send us a refill request.  Results - Allow up to a week for lab results to be processed and reviewed. Phone calls - Allow up to 24 hours for non-urgent calls to be returned. Prior Authorization - May take up to 2 weeks to process depending on your insurance. Forms - FMLA, DMV, Patient Assistance, etc. will take up to 2 weeks to process. Cancellations - Please notify the office in advance if you cannot keep your appointment. Samples - Will only be dispensed at visits as supplies are limited. If you are having a medical emergency, call 911.

## 2018-05-09 NOTE — LETTER
5/10/2018 10:57 AM 
 
Patient:  Suzanne Thorne YOB: 1955 Date of Visit: 5/9/2018 Dear Inessa Jackson MD 
[address] [place] [address] VIA Facsimile: [phone] 
 : Thank you for referring Mr. Keshav Mckeon to me for evaluation/treatment. Below are the relevant portions of my assessment and plan of care. If you have questions, please do not hesitate to call me. I look forward to following Mr. Purvi Hall along with you. Sincerely, Devonte Little MD

## 2018-05-09 NOTE — PROGRESS NOTES
Tierra Bryson is a 59 y.o. male here for   Chief Complaint   Patient presents with    Diabetes    Thyroid Problem       Functional glucose monitor and record keeping system? - yes  Eye exam within last year? - YOVANA Alexander exam within last year? - due    1. Have you been to the ER, urgent care clinic since your last visit? Hospitalized since your last visit? -no  2. Have you seen or consulted any other health care providers outside of the [address] [place] [address] since your last visit?   Include any pap smears or colon screening.-no      Lab Results   Component Value Date/Time    Hemoglobin A1c 6.4 (H) 05/02/2018 08:24 AM    Hemoglobin A1c (POC) 6.1 06/05/2014 09:35 AM       Wt Readings from Last 3 Encounters:   11/15/17 216 lb (98 kg)   05/15/17 209 lb 12.8 oz (95.2 kg)   11/14/16 211 lb (95.7 kg)     Temp Readings from Last 3 Encounters:   11/15/17 96.2 °F (35.7 °C) (Oral)   05/15/17 97.2 °F (36.2 °C) (Oral)   11/14/16 96 °F (35.6 °C) (Oral)     BP Readings from Last 3 Encounters:   11/15/17 134/61   05/15/17 112/64   11/14/16 (!) 119/95     Pulse Readings from Last 3 Encounters:   11/15/17 77   05/15/17 79   11/14/16 73

## 2018-05-09 NOTE — MR AVS SNAPSHOT
[address] [facility] [address] 
[phone] Patient: Divina Clinton MRN: [id number] RTW:[id number] Visit Information Date & Time Provider Department Dept. Phone Encounter #  
 5/9/2018  9:30 AM Leigh Ann Palomo MD [facility] [phone] [id number] Follow-up Instructions Return in about 6 months (around 11/9/2018). Upcoming Health Maintenance Date Due  
 FOOT EXAM Q1 4/29/1966 EYE EXAM RETINAL OR DILATED Q1 4/29/1966 Pneumococcal 19-64 Medium Risk (1 of 1 - PPSV23) 4/29/1975 DTaP/Tdap/Td series (1 - Tdap) 4/29/1977 FOBT Q 1 YEAR AGE 50-75 4/29/2006 ZOSTER VACCINE AGE 60> 2/29/2016 LIPID PANEL Q1 5/8/2018 Influenza Age 5 to Adult 8/1/2018 HEMOGLOBIN A1C Q6M 11/2/2018 MICROALBUMIN Q1 5/2/2019 Allergies as of 5/9/2018  Review Complete On: 5/9/2018 By: Vivi Oliveros LPN Severity Noted Reaction Type Reactions Glyburide High 08/03/2010   Side Effect Other (comments)  
 hypoglycemic Crestor [Rosuvastatin] Medium 08/03/2010   Side Effect Nausea Only Quinolones Medium 08/03/2010   Side Effect Other (comments) Statins-hmg-coa Reductase Inhibitors Medium 08/03/2010   Side Effect Other (comments) Neuromuscular Blockers, Steroidal  08/03/2010   Side Effect Other (comments)  
 dizziness Current Immunizations  Never Reviewed No immunizations on file. Not reviewed this visit You Were Diagnosed With   
  
 Codes Comments Type 2 diabetes mellitus with hyperglycemia, without long-term current use of insulin (HCC)    -  Primary ICD-10-CM: E11.65 ICD-9-CM: 250.00, 790.29 Hypothyroidism due to acquired atrophy of thyroid     ICD-10-CM: E03.4 ICD-9-CM: 244.8, 246.8 Essential hypertension     ICD-10-CM: I10 
ICD-9-CM: 401.9 Mixed hyperlipidemia     ICD-10-CM: E78.2 ICD-9-CM: 272.2 Vitals BP Pulse Temp Resp Height(growth percentile) Weight(growth percentile)  
 117/62 (BP 1 Location: Left arm, BP Patient Position: Sitting) 65 96.3 °F (35.7 °C) (Oral) 16 5' 8\" (1.727 m) 209 lb 8 oz (95 kg) SpO2 BMI Smoking Status 98% 31.85 kg/m2 Former Smoker Vitals History BMI and BSA Data Body Mass Index Body Surface Area  
 31.85 kg/m 2 2.13 m 2 Preferred Pharmacy Pharm[facility] Name Phone [facility] [address] Your Updated Medication List  
  
   
This list is accurate as of 5/9/18 10:00 AM.  Always use your most recent med list.  
  
  
  
  
 TONY CHILDRENS ASPIRIN 81 mg chewable tablet Generic drug:  aspirin Take 81 mg by mouth daily. Patient states three times per week FISH OIL 1,000 mg Cap Generic drug:  omega-3 fatty acids-vitamin e Take 1 Cap by mouth two (2) times a day. glucose blood VI test strips strip Commonly known as:  ASCENSIA AUTODISC VI, ONE TOUCH ULTRA TEST VI  
Use to test blood sugar 3 times daily Dx Code: E11.65 Lancets Misc Use to test blood sugar 3 times daily Dx Code: E11.65  
  
 levothyroxine 200 mcg tablet Commonly known as:  SYNTHROID Take 1 Tab by mouth Daily (before breakfast). Except Sunday  
  
 lisinopril 20 mg tablet Commonly known as:  Dorathy Massed Take 1 Tab by mouth daily. metFORMIN 1,000 mg tablet Commonly known as:  GLUCOPHAGE Take 1 Tab by mouth two (2) times daily (with meals). POTASSIMIN PO Take  by mouth. SITagliptin 100 mg tablet Commonly known as:  Peter Fruit Take 1 Tab by mouth every morning. VITAMIN D3 2,000 unit Tab Generic drug:  cholecalciferol (vitamin D3) Take  by mouth.  
  
 vitamin e 100 unit capsule Commonly known as:  E GEMS Take 100 Units by mouth every other day. Follow-up Instructions Return in about 6 months (around 11/9/2018). Patient Instructions Please remember to bring your meter and/or log to every visit. Also, be sure to have a dilated diabetic eye exam done annually. Refills -Please call your pharmacy and have them send us a refill request. 
Results - Allow up to a week for lab results to be processed and reviewed. Phone calls - Allow up to 24 hours for non-urgent calls to be returned. Prior Authorization - May take up to 2 weeks to process depending on your insurance. Forms - FMLA, DMV, Patient Assistance, etc. will take up to 2 weeks to process. Cancellations - Please notify the office in advance if you cannot keep your appointment. Samples - Will only be dispensed at visits as supplies are limited. If you are having a medical emergency, call 911. Introducing [facility]! [facility] introduces [employer] patient portal. Now you can access parts of your medical record, email your doctor's office, and request medication refills online. 1. In your internet browser, go to [email or web address] 2. Click on the First Time User? Click Here link in the Sign In box. You will see the New Member Sign Up page. 3. Enter your [employer] Access Code exactly as it appears below. You will not need to use this code after youve completed the sign-up process. If you do not sign up before the expiration date, you must request a new code. · [employer] Access Code: [email or web address] Expires: 8/7/2018 10:00 AM 
 
4. Enter the last four digits of your Social Security Number (xxxx) and Date of Birth (mm/dd/yyyy) as indicated and click Submit. You will be taken to the next sign-up page. 5. Create a [employer] ID. This will be your [employer] login ID and cannot be changed, so think of one that is secure and easy to remember. 6. Create a [employer] password. You can change your password at any time. 7. Enter your Password Reset Question and Answer. This can be used at a later time if you forget your password. 8. Enter your e-mail address. You will receive e-mail notification when new information is available in [address] 9. Click Sign Up. You can now view and download portions of your medical record. 10. Click the Download Summary menu link to download a portable copy of your medical information. If you have questions, please visit the Frequently Asked Questions section of the [employer] website. Remember, [employer] is NOT to be used for urgent needs. For medical emergencies, dial 911. Now available from your iPhone and Android! Please provide this summary of care documentation to your next provider. Your primary care clinician is listed as María Rabago. If you have any questions after today's visit, please call [phone].

## 2018-11-13 ENCOUNTER — OFFICE VISIT (OUTPATIENT)
Dept: ENDOCRINOLOGY | Age: 63
End: 2018-11-13

## 2018-11-13 VITALS
HEART RATE: 83 BPM | DIASTOLIC BLOOD PRESSURE: 64 MMHG | BODY MASS INDEX: 32.87 KG/M2 | SYSTOLIC BLOOD PRESSURE: 139 MMHG | TEMPERATURE: 97.3 F | RESPIRATION RATE: 16 BRPM | HEIGHT: 68 IN | OXYGEN SATURATION: 96 % | WEIGHT: 216.9 LBS

## 2018-11-13 DIAGNOSIS — I10 ESSENTIAL HYPERTENSION: ICD-10-CM

## 2018-11-13 DIAGNOSIS — E11.65 UNCONTROLLED TYPE 2 DIABETES MELLITUS WITH HYPERGLYCEMIA, WITHOUT LONG-TERM CURRENT USE OF INSULIN (HCC): ICD-10-CM

## 2018-11-13 DIAGNOSIS — E11.65 TYPE 2 DIABETES MELLITUS WITH HYPERGLYCEMIA, WITHOUT LONG-TERM CURRENT USE OF INSULIN (HCC): Primary | ICD-10-CM

## 2018-11-13 DIAGNOSIS — E03.9 HYPOTHYROIDISM, UNSPECIFIED TYPE: ICD-10-CM

## 2018-11-13 DIAGNOSIS — E03.4 HYPOTHYROIDISM DUE TO ACQUIRED ATROPHY OF THYROID: ICD-10-CM

## 2018-11-13 RX ORDER — LANCETS
EACH MISCELLANEOUS
Qty: 300 EACH | Refills: 3 | Status: SHIPPED | OUTPATIENT
Start: 2018-11-13 | End: 2019-04-16 | Stop reason: SDUPTHER

## 2018-11-13 RX ORDER — METFORMIN HYDROCHLORIDE 1000 MG/1
1000 TABLET ORAL 2 TIMES DAILY WITH MEALS
Qty: 180 TAB | Refills: 3 | Status: SHIPPED | OUTPATIENT
Start: 2018-11-13 | End: 2019-04-16 | Stop reason: SDUPTHER

## 2018-11-13 RX ORDER — LEVOTHYROXINE SODIUM 200 UG/1
200 TABLET ORAL
Qty: 90 TAB | Refills: 3 | Status: SHIPPED | OUTPATIENT
Start: 2018-11-13 | End: 2019-04-16 | Stop reason: SDUPTHER

## 2018-11-13 RX ORDER — LISINOPRIL 20 MG/1
20 TABLET ORAL DAILY
Qty: 90 TAB | Refills: 3 | Status: SHIPPED | OUTPATIENT
Start: 2018-11-13 | End: 2019-04-16 | Stop reason: SDUPTHER

## 2018-11-13 NOTE — PROGRESS NOTES
Krissy Ayala is a 59 y.o. male here for   Chief Complaint   Patient presents with    Diabetes    Thyroid Problem       Functional glucose monitor and record keeping system? - yes  Eye exam within last year? - on file  Foot exam within last year? - on file    1. Have you been to the ER, urgent care clinic since your last visit? Hospitalized since your last visit? -no     2. Have you seen or consulted any other health care providers outside of the [address] [place] [address] since your last visit? Include any pap smears or colon screening. -PCP

## 2018-11-13 NOTE — PROGRESS NOTES
Marilyn Goltz ,MD          Patient Information  Date:11/13/2018  Name : Walter Terry 59 y.o.     YOB: 1955         Referred by: Blaire Craft MD         Chief Complaint   Patient presents with    Diabetes    Thyroid Problem       History of Present Illness: Walter Terry is a 59 y.o. male here for  Follow-up of  Type 2 Diabetes Mellitus. DM dx 2006   He was on sulfonylureas which was discontinued due to hypoglycemia. on januvia and metformin   LFts was mildly abnormal - improved     Checking glucose at home  Has not noticed significant hypoglycemia     Compliant with medications    No acute issues    He was referred by Dr. Mauricio Carty for the management of type 2 diabetes. He was seen by Dr. Magalie Delong in 2010 for hyperthyroidism, status post radioactive iodine therapy. He is currently hypothyroid on Levothyroxine replacement. No tingling, numbness. No calf claudication          Wt Readings from Last 3 Encounters:   11/13/18 216 lb 14.4 oz (98.4 kg)   05/09/18 209 lb 8 oz (95 kg)   11/15/17 216 lb (98 kg)       BP Readings from Last 3 Encounters:   11/13/18 139/64   05/09/18 117/62   11/15/17 134/61           Past Medical History:   Diagnosis Date    Diabetes ([facility] [address]     Hyperlipidemia LDL goal < 100     Hyperthyroidism 20 yrs ago    treated with MANZANARES    Hypothyroidism     s/p MANZANARES     Current Outpatient Medications   Medication Sig    SITagliptin (JANUVIA) 100 mg tablet Take 1 Tab by mouth every morning.  metFORMIN (GLUCOPHAGE) 1,000 mg tablet Take 1 Tab by mouth two (2) times daily (with meals).  lisinopril (PRINIVIL, ZESTRIL) 20 mg tablet Take 1 Tab by mouth daily.  levothyroxine (SYNTHROID) 200 mcg tablet Take 1 Tab by mouth Daily (before breakfast).  Except Sunday    Lancets misc Use to test blood sugar 3 times daily Dx Code: E11.65    glucose blood VI test strips (ASCENSIA AUTODISC VI, ONE TOUCH ULTRA TEST VI) strip Use to test blood sugar 3 times daily Dx Code: E11.65    vitamin e (E GEMS) 100 unit capsule Take 100 Units by mouth every other day.  omega-3 fatty acids-vitamin e (FISH OIL) 1,000 mg cap Take 1 Cap by mouth two (2) times a day.  POTASSIUM (POTASSIMIN PO) Take  by mouth.  aspirin (TONY CHILDRENS ASPIRIN) 81 mg chewable tablet Take 81 mg by mouth daily. Patient states three times per week    cholecalciferol, vitamin D3, (VITAMIN D3) 2,000 unit tab Take  by mouth. No current facility-administered medications for this visit. Allergies   Allergen Reactions    Glyburide Other (comments)     hypoglycemic    Crestor [Rosuvastatin] Nausea Only    Quinolones Other (comments)    Statins-Hmg-Coa Reductase Inhibitors Other (comments)    Neuromuscular Blockers, Steroidal Other (comments)     dizziness         Review of Systems:  - Constitutional Symptoms: no fevers, no chills  - Eyes: no blurry vision no double vision  - Cardiovascular: no chest pain ,no palpitations  - Respiratory: no cough no shortness of breath  - Gastrointestinal: no dysphagia no  abdominal pain  - Musculoskeletal: no joint pains no  weakness  - Integumentary: no rashes  -     Physical Examination:   Blood pressure 139/64, pulse 83, temperature 97.3 °F (36.3 °C), temperature source Oral, resp. rate 16, height 5' 8\" (1.727 m), weight 216 lb 14.4 oz (98.4 kg), SpO2 96 %. Estimated body mass index is 32.98 kg/m² as calculated from the following:    Height as of this encounter: 5' 8\" (1.727 m). -   Weight as of this encounter: 216 lb 14.4 oz (98.4 kg).   - General: pleasant, no distress, good eye contact  - HEENT: no pallor, no periorbital edema, EOMI  - Neck: supple, no thyromegaly  - Cardiovascular: regular, normal rate, normal S1 and S2, murmur  - Respiratory: clear to auscultation bilaterally  - Gastrointestinal: soft, nontender, nondistended,  BS +  - Musculoskeletal:  no edema,  -   Diabetic foot exam: May 2018    Left:     Vibratory sensation normal    Filament test normal sensation with micro filament   Pulse DP: 1+    Deformities: None  Right:    Vibratory sensation normal   Filament test normal sensation with micro filament   Pulse DP: 1+   Deformities: None      Data Reviewed:     [] Glucose records reviewed. [] See glucose records for details (to be scanned). [x] A1C  [x] Reviewed labs    Lab Results   Component Value Date/Time    Hemoglobin A1c 6.7 (H) 11/06/2018 08:11 AM    Hemoglobin A1c 6.4 (H) 05/02/2018 08:24 AM    Hemoglobin A1c 6.5 (H) 11/08/2017 08:07 AM    Glucose 120 (H) 11/06/2018 08:11 AM    Glucose POC 98 06/05/2014 09:35 AM    Microalb/Creat ratio (ug/mg creat.) 41.8 (H) 11/06/2018 08:11 AM    LDL,Direct 97 05/02/2018 08:24 AM    LDL, calculated 85 05/08/2017 08:16 AM    Creatinine 0.77 11/06/2018 08:11 AM      Lab Results   Component Value Date/Time    Cholesterol, total 148 05/08/2017 08:16 AM    HDL Cholesterol 47 05/08/2017 08:16 AM    LDL,Direct 97 05/02/2018 08:24 AM    LDL, calculated 85 05/08/2017 08:16 AM    Triglyceride 79 05/08/2017 08:16 AM     Lab Results   Component Value Date/Time    ALT (SGPT) 42 11/06/2018 08:11 AM    AST (SGOT) 22 11/06/2018 08:11 AM    Alk. phosphatase 79 11/06/2018 08:11 AM    Bilirubin, total 0.3 11/06/2018 08:11 AM     Lab Results   Component Value Date/Time    GFR est  11/06/2018 08:11 AM    GFR est non-AA 97 11/06/2018 08:11 AM    Creatinine 0.77 11/06/2018 08:11 AM    BUN 16 11/06/2018 08:11 AM    Sodium 139 11/06/2018 08:11 AM    Potassium 4.7 11/06/2018 08:11 AM    Chloride 103 11/06/2018 08:11 AM    CO2 22 11/06/2018 08:11 AM      Lab Results   Component Value Date/Time    TSH 0.587 11/06/2018 08:11 AM        Assessment/Plan:     1.  Type 2 Diabetes Mellitus with no known complications  Lab Results   Component Value Date/Time    Hemoglobin A1c 6.7 (H) 11/06/2018 08:11 AM    Hemoglobin A1c (POC) 6.1 06/05/2014 09:35 AM   controlled   Continue Metformin  on januvia  FLU annually ,Pneumovax ,aspirin daily,annual eye exam,microalbumin    2. HTN : Continue current therapy . 3. Post ablative hypothyroidism-continue levothyroxine,none  on LT4 on Sunday     4. Obesity:Body mass index is 32.98 kg/m². 5 Hyperlipidemia - diet controlled  Refused statin - understands the benefits      6 Transaminitis - NAFLD  Vit E  Hepatitis panel negative      Thank you for allowing me to participate in the care of this patient.     Vadim Cardenas MD

## 2018-12-25 ENCOUNTER — ED HISTORICAL/CONVERTED ENCOUNTER (OUTPATIENT)
Dept: OTHER | Age: 63
End: 2018-12-25

## 2019-04-09 DIAGNOSIS — E03.4 HYPOTHYROIDISM DUE TO ACQUIRED ATROPHY OF THYROID: ICD-10-CM

## 2019-04-09 DIAGNOSIS — E11.65 TYPE 2 DIABETES MELLITUS WITH HYPERGLYCEMIA, WITHOUT LONG-TERM CURRENT USE OF INSULIN (HCC): ICD-10-CM

## 2019-04-09 DIAGNOSIS — I10 ESSENTIAL HYPERTENSION: ICD-10-CM

## 2019-04-10 LAB
ALBUMIN SERPL-MCNC: 4.1 G/DL (ref 3.6–4.8)
ALBUMIN/CREAT UR: 58.3 MG/G CREAT (ref 0–30)
ALBUMIN/GLOB SERPL: 1.5 {RATIO} (ref 1.2–2.2)
ALP SERPL-CCNC: 82 IU/L (ref 39–117)
ALT SERPL-CCNC: 48 IU/L (ref 0–44)
AST SERPL-CCNC: 24 IU/L (ref 0–40)
BILIRUB SERPL-MCNC: 0.3 MG/DL (ref 0–1.2)
BUN SERPL-MCNC: 22 MG/DL (ref 8–27)
BUN/CREAT SERPL: 30 (ref 10–24)
CALCIUM SERPL-MCNC: 8.9 MG/DL (ref 8.6–10.2)
CHLORIDE SERPL-SCNC: 101 MMOL/L (ref 96–106)
CHOLEST SERPL-MCNC: 153 MG/DL (ref 100–199)
CO2 SERPL-SCNC: 21 MMOL/L (ref 20–29)
CREAT SERPL-MCNC: 0.74 MG/DL (ref 0.76–1.27)
CREAT UR-MCNC: 94.2 MG/DL
EST. AVERAGE GLUCOSE BLD GHB EST-MCNC: 146 MG/DL
GLOBULIN SER CALC-MCNC: 2.8 G/DL (ref 1.5–4.5)
GLUCOSE SERPL-MCNC: 136 MG/DL (ref 65–99)
HBA1C MFR BLD: 6.7 % (ref 4.8–5.6)
HDLC SERPL-MCNC: 43 MG/DL
INTERPRETATION, 910389: NORMAL
LDLC SERPL CALC-MCNC: 86 MG/DL (ref 0–99)
Lab: NORMAL
MICROALBUMIN UR-MCNC: 54.9 UG/ML
POTASSIUM SERPL-SCNC: 4.4 MMOL/L (ref 3.5–5.2)
PROT SERPL-MCNC: 6.9 G/DL (ref 6–8.5)
SODIUM SERPL-SCNC: 139 MMOL/L (ref 134–144)
TRIGL SERPL-MCNC: 118 MG/DL (ref 0–149)
VLDLC SERPL CALC-MCNC: 24 MG/DL (ref 5–40)

## 2019-04-15 NOTE — PROGRESS NOTES
Anya Client is a 59 y.o. male here for Chief Complaint Patient presents with  Diabetes  Thyroid Problem Functional glucose monitor and record keeping system? -yes Eye exam within last year? - on file Foot exam within last year? - on file 1. Have you been to the ER, urgent care clinic since your last visit? Hospitalized since your last visit? -no 
 
2. Have you seen or consulted any other health care providers outside of the [address] [place] [address] since your last visit?   Include any pap smears or colon screening.-no

## 2019-04-16 ENCOUNTER — OFFICE VISIT (OUTPATIENT)
Dept: ENDOCRINOLOGY | Age: 64
End: 2019-04-16

## 2019-04-16 VITALS
HEART RATE: 70 BPM | HEIGHT: 68 IN | WEIGHT: 212 LBS | SYSTOLIC BLOOD PRESSURE: 145 MMHG | BODY MASS INDEX: 32.13 KG/M2 | TEMPERATURE: 95.6 F | DIASTOLIC BLOOD PRESSURE: 78 MMHG | OXYGEN SATURATION: 97 % | RESPIRATION RATE: 14 BRPM

## 2019-04-16 DIAGNOSIS — E78.2 MIXED HYPERLIPIDEMIA: ICD-10-CM

## 2019-04-16 DIAGNOSIS — E11.65 TYPE 2 DIABETES MELLITUS WITH HYPERGLYCEMIA, WITHOUT LONG-TERM CURRENT USE OF INSULIN (HCC): Primary | ICD-10-CM

## 2019-04-16 DIAGNOSIS — I10 ESSENTIAL HYPERTENSION: ICD-10-CM

## 2019-04-16 DIAGNOSIS — E03.4 HYPOTHYROIDISM DUE TO ACQUIRED ATROPHY OF THYROID: ICD-10-CM

## 2019-04-16 RX ORDER — LEVOTHYROXINE SODIUM 200 UG/1
200 TABLET ORAL
Qty: 90 TAB | Refills: 3 | Status: SHIPPED | OUTPATIENT
Start: 2019-04-16 | End: 2019-07-30 | Stop reason: SDUPTHER

## 2019-04-16 RX ORDER — LISINOPRIL 20 MG/1
20 TABLET ORAL DAILY
Qty: 90 TAB | Refills: 3 | Status: SHIPPED | OUTPATIENT
Start: 2019-04-16 | End: 2019-07-30 | Stop reason: SDUPTHER

## 2019-04-16 RX ORDER — LANCETS
EACH MISCELLANEOUS
Qty: 300 EACH | Refills: 3 | Status: SHIPPED | OUTPATIENT
Start: 2019-04-16 | End: 2019-07-30 | Stop reason: SDUPTHER

## 2019-04-16 RX ORDER — METFORMIN HYDROCHLORIDE 1000 MG/1
1000 TABLET ORAL 2 TIMES DAILY WITH MEALS
Qty: 180 TAB | Refills: 3 | Status: SHIPPED | OUTPATIENT
Start: 2019-04-16 | End: 2019-07-30 | Stop reason: SDUPTHER

## 2019-04-16 NOTE — LETTER
4/17/19 Patient: Jimena Crocker YOB: 1955 Date of Visit: 4/16/2019 Elli Bailey MD 
[address] [place] [address] VIA Facsimile: [phone] Dear Elli Bailey MD, Thank you for referring Mr. Janette Harvey to [address] [place] [address] for evaluation. My notes for this consultation are attached. If you have questions, please do not hesitate to call me. I look forward to following your patient along with you. Sincerely, Donnie Alexander MD

## 2019-04-16 NOTE — PROGRESS NOTES
[facility] DIABETES AND ENDOCRINOLOGY Molly Cintron MD 
 
 
 
 
Patient Information Date:4/17/2019 Name : Tayler Nina 59 y.o.    
YOB: 1955 Referred by: Kim Aceves MD  
 
 
 
Chief Complaint Patient presents with  Diabetes  Thyroid Problem History of Present Illness: Tayler Nina is a 59 y.o. male here for  Follow-up of  Type 2 Diabetes Mellitus. DM dx 2006 He was on sulfonylureas which was discontinued due to hypoglycemia. on januvia and metformin LFts was mildly abnormal -on vitamin E Checking glucose at home Has not noticed significant hypoglycemia Compliant with medications No acute issues He was referred by Dr. Tiffani Butt for the management of type 2 diabetes. He was seen by Dr. Eriberto Rodriguez in 2010 for hyperthyroidism, status post radioactive iodine therapy. He is currently hypothyroid on Levothyroxine replacement. No tingling, numbness. No calf claudication Wt Readings from Last 3 Encounters:  
04/16/19 212 lb (96.2 kg)  
11/13/18 216 lb 14.4 oz (98.4 kg) 05/09/18 209 lb 8 oz (95 kg) BP Readings from Last 3 Encounters:  
04/16/19 145/78  
11/13/18 139/64  
05/09/18 117/62 Past Medical History:  
Diagnosis Date  Diabetes ([facility] [address]  Hyperlipidemia LDL goal < 100  Hyperthyroidism 20 yrs ago  
 treated with MANZANARES  
 Hypothyroidism   
 s/p MANZANARES Current Outpatient Medications Medication Sig  
 lisinopril (PRINIVIL, ZESTRIL) 20 mg tablet Take 1 Tab by mouth daily.  SITagliptin (JANUVIA) 100 mg tablet Take 1 Tab by mouth every morning.  metFORMIN (GLUCOPHAGE) 1,000 mg tablet Take 1 Tab by mouth two (2) times daily (with meals).  levothyroxine (SYNTHROID) 200 mcg tablet Take 1 Tab by mouth Daily (before breakfast). Except Sunday  lancets misc Use to test blood sugar 3 times daily Dx Code: E11.65  
  vitamin e (E GEMS) 100 unit capsule Take 100 Units by mouth every other day.  omega-3 fatty acids-vitamin e (FISH OIL) 1,000 mg cap Take 1 Cap by mouth daily.  POTASSIUM (POTASSIMIN PO) Take  by mouth.  aspirin (TONY CHILDRENS ASPIRIN) 81 mg chewable tablet Take 81 mg by mouth daily. Patient states three times per week  glucose blood VI test strips (ONETOUCH VERIO) strip Test TID Dx Code: E11.65  
 cholecalciferol, vitamin D3, (VITAMIN D3) 2,000 unit tab Take  by mouth. No current facility-administered medications for this visit. Allergies Allergen Reactions  Glyburide Other (comments)  
  hypoglycemic  Crestor [Rosuvastatin] Nausea Only  Quinolones Other (comments)  Statins-Hmg-Coa Reductase Inhibitors Other (comments)  Neuromuscular Blockers, Steroidal Other (comments)  
  dizziness Review of Systems: 
- Constitutional Symptoms: no fevers, no chills - Eyes: no blurry vision no double vision - Cardiovascular: no chest pain ,no palpitations - Respiratory: no cough no shortness of breath 
- Gastrointestinal: no dysphagia no  abdominal pain - Musculoskeletal: no joint pains no  weakness - Integumentary: no rashes - Physical Examination: 
 Blood pressure 145/78, pulse 70, temperature 95.6 °F (35.3 °C), temperature source Oral, resp. rate 14, height 5' 8\" (1.727 m), weight 212 lb (96.2 kg), SpO2 97 %. Estimated body mass index is 32.23 kg/m² as calculated from the following: 
  Height as of this encounter: 5' 8\" (1.727 m). -   Weight as of this encounter: 212 lb (96.2 kg). - General: pleasant, no distress, good eye contact 
- HEENT: no pallor, no periorbital edema, EOMI 
- Neck: supple, no thyromegaly - Cardiovascular: regular, normal rate, normal S1 and S2, murmur - Respiratory: clear to auscultation bilaterally - Gastrointestinal: soft, nontender, nondistended,  BS + 
- Musculoskeletal:  no edema, 
-  
Diabetic foot exam: May 2018 Left:   
 Vibratory sensation normal  
 Filament test normal sensation with micro filament Pulse DP: 1+ Deformities: None Right:  
 Vibratory sensation normal 
 Filament test normal sensation with micro filament Pulse DP: 1+ Deformities: None Data Reviewed:  
 
 
 
Lab Results Component Value Date/Time Hemoglobin A1c 6.7 (H) 04/09/2019 08:16 AM  
 Hemoglobin A1c 6.7 (H) 11/06/2018 08:11 AM  
 Hemoglobin A1c 6.4 (H) 05/02/2018 08:24 AM  
 Glucose 136 (H) 04/09/2019 08:16 AM  
 Glucose POC 98 06/05/2014 09:35 AM  
 Microalb/Creat ratio (ug/mg creat.) 58.3 (H) 04/09/2019 08:16 AM  
 LDL,Direct 97 05/02/2018 08:24 AM  
 LDL, calculated 86 04/09/2019 08:16 AM  
 Creatinine 0.74 (L) 04/09/2019 08:16 AM  
  
Lab Results Component Value Date/Time Cholesterol, total 153 04/09/2019 08:16 AM  
 HDL Cholesterol 43 04/09/2019 08:16 AM  
 LDL,Direct 97 05/02/2018 08:24 AM  
 LDL, calculated 86 04/09/2019 08:16 AM  
 Triglyceride 118 04/09/2019 08:16 AM  
 
Lab Results Component Value Date/Time ALT (SGPT) 48 (H) 04/09/2019 08:16 AM  
 AST (SGOT) 24 04/09/2019 08:16 AM  
 Alk. phosphatase 82 04/09/2019 08:16 AM  
 Bilirubin, total 0.3 04/09/2019 08:16 AM  
 
Lab Results Component Value Date/Time GFR est  04/09/2019 08:16 AM  
 GFR est non-AA 99 04/09/2019 08:16 AM  
 Creatinine 0.74 (L) 04/09/2019 08:16 AM  
 BUN 22 04/09/2019 08:16 AM  
 Sodium 139 04/09/2019 08:16 AM  
 Potassium 4.4 04/09/2019 08:16 AM  
 Chloride 101 04/09/2019 08:16 AM  
 CO2 21 04/09/2019 08:16 AM  
  
Lab Results Component Value Date/Time TSH 0.587 11/06/2018 08:11 AM  
  
 
Assessment/Plan: 1. Type 2 Diabetes Mellitus with no known complications Lab Results Component Value Date/Time Hemoglobin A1c 6.7 (H) 04/09/2019 08:16 AM  
 Hemoglobin A1c (POC) 6.1 06/05/2014 09:35 AM  
controlled Continue Metformin 
on januvia FLU annually ,Pneumovax ,aspirin daily,annual eye exam,microalbumin 2.  HTN : Continue current therapy . 3. Post ablative hypothyroidism-continue levothyroxine,none  on LT4 on Sunday 4. Obesity:Body mass index is 32.23 kg/m². 5 Hyperlipidemia - diet controlled Refused statin - understands the benefits 6 Transaminitis - NAFLD Vit E Hepatitis panel negative Thank you for allowing me to participate in the care of this patient. Alicia Lopez MD 
 
Patient verbalized understanding

## 2019-05-10 ENCOUNTER — TELEPHONE (OUTPATIENT)
Dept: ENDOCRINOLOGY | Age: 64
End: 2019-05-10

## 2019-05-10 NOTE — TELEPHONE ENCOUNTER
----- Message from Balbina Griffith sent at 5/10/2019 12:12 PM EDT -----  Regarding: Dr. Raeann Dwyer / Ghazal Ledesma with [address] ([phone]) would like to speak to somebody at the practice regarding verifying a typo on a date for Rx \"Jeffuvia\"  Pt is at the pharmacy

## 2019-05-10 NOTE — TELEPHONE ENCOUNTER
Pharmacist stated they have an rx for Januvia with the date 5/9/18 and wanted to see if we meant 2019. Informed pharmacist we e-scribed Januvia last month. He stated the pt had brought in this rx but they will go ahead and use e-scribed rx with current date as it is the same. No further questions or concerns at this time.

## 2019-07-19 ENCOUNTER — TELEPHONE (OUTPATIENT)
Dept: ENDOCRINOLOGY | Age: 64
End: 2019-07-19

## 2019-07-19 DIAGNOSIS — E11.65 TYPE 2 DIABETES MELLITUS WITH HYPERGLYCEMIA, WITHOUT LONG-TERM CURRENT USE OF INSULIN (HCC): Primary | ICD-10-CM

## 2019-07-23 DIAGNOSIS — E11.65 TYPE 2 DIABETES MELLITUS WITH HYPERGLYCEMIA, WITHOUT LONG-TERM CURRENT USE OF INSULIN (HCC): ICD-10-CM

## 2019-07-24 LAB
ALBUMIN SERPL-MCNC: 4.2 G/DL (ref 3.6–4.8)
ALBUMIN/CREAT UR: 40.1 MG/G CREAT (ref 0–30)
ALBUMIN/GLOB SERPL: 1.5 {RATIO} (ref 1.2–2.2)
ALP SERPL-CCNC: 72 IU/L (ref 39–117)
ALT SERPL-CCNC: 56 IU/L (ref 0–44)
AST SERPL-CCNC: 21 IU/L (ref 0–40)
BILIRUB SERPL-MCNC: 0.3 MG/DL (ref 0–1.2)
BUN SERPL-MCNC: 24 MG/DL (ref 8–27)
BUN/CREAT SERPL: 32 (ref 10–24)
CALCIUM SERPL-MCNC: 9 MG/DL (ref 8.6–10.2)
CHLORIDE SERPL-SCNC: 102 MMOL/L (ref 96–106)
CHOLEST SERPL-MCNC: 163 MG/DL (ref 100–199)
CO2 SERPL-SCNC: 22 MMOL/L (ref 20–29)
CREAT SERPL-MCNC: 0.74 MG/DL (ref 0.76–1.27)
CREAT UR-MCNC: 91.1 MG/DL
EST. AVERAGE GLUCOSE BLD GHB EST-MCNC: 140 MG/DL
GLOBULIN SER CALC-MCNC: 2.8 G/DL (ref 1.5–4.5)
GLUCOSE SERPL-MCNC: 123 MG/DL (ref 65–99)
HBA1C MFR BLD: 6.5 % (ref 4.8–5.6)
HDLC SERPL-MCNC: 47 MG/DL
INTERPRETATION, 910389: NORMAL
LDLC SERPL CALC-MCNC: 95 MG/DL (ref 0–99)
Lab: NORMAL
MICROALBUMIN UR-MCNC: 36.5 UG/ML
POTASSIUM SERPL-SCNC: 4.6 MMOL/L (ref 3.5–5.2)
PROT SERPL-MCNC: 7 G/DL (ref 6–8.5)
SODIUM SERPL-SCNC: 137 MMOL/L (ref 134–144)
TRIGL SERPL-MCNC: 103 MG/DL (ref 0–149)
VLDLC SERPL CALC-MCNC: 21 MG/DL (ref 5–40)

## 2019-07-30 ENCOUNTER — OFFICE VISIT (OUTPATIENT)
Dept: ENDOCRINOLOGY | Age: 64
End: 2019-07-30

## 2019-07-30 VITALS
SYSTOLIC BLOOD PRESSURE: 122 MMHG | DIASTOLIC BLOOD PRESSURE: 58 MMHG | BODY MASS INDEX: 31.52 KG/M2 | HEART RATE: 78 BPM | RESPIRATION RATE: 16 BRPM | WEIGHT: 208 LBS | OXYGEN SATURATION: 97 % | TEMPERATURE: 96.7 F | HEIGHT: 68 IN

## 2019-07-30 DIAGNOSIS — E11.65 TYPE 2 DIABETES MELLITUS WITH HYPERGLYCEMIA, WITHOUT LONG-TERM CURRENT USE OF INSULIN (HCC): ICD-10-CM

## 2019-07-30 DIAGNOSIS — I10 ESSENTIAL HYPERTENSION: ICD-10-CM

## 2019-07-30 DIAGNOSIS — E11.65 TYPE 2 DIABETES MELLITUS WITH HYPERGLYCEMIA, WITHOUT LONG-TERM CURRENT USE OF INSULIN (HCC): Primary | ICD-10-CM

## 2019-07-30 RX ORDER — LEVOTHYROXINE SODIUM 200 UG/1
200 TABLET ORAL
Qty: 90 TAB | Refills: 3 | Status: SHIPPED | OUTPATIENT
Start: 2019-07-30 | End: 2019-12-05 | Stop reason: SDUPTHER

## 2019-07-30 RX ORDER — LANCETS
EACH MISCELLANEOUS
Qty: 300 EACH | Refills: 3 | Status: SHIPPED | OUTPATIENT
Start: 2019-07-30 | End: 2019-12-05 | Stop reason: SDUPTHER

## 2019-07-30 RX ORDER — LISINOPRIL 20 MG/1
20 TABLET ORAL DAILY
Qty: 90 TAB | Refills: 3 | Status: SHIPPED | OUTPATIENT
Start: 2019-07-30 | End: 2019-12-05 | Stop reason: SDUPTHER

## 2019-07-30 RX ORDER — METFORMIN HYDROCHLORIDE 1000 MG/1
1000 TABLET ORAL 2 TIMES DAILY WITH MEALS
Qty: 180 TAB | Refills: 3 | Status: SHIPPED | OUTPATIENT
Start: 2019-07-30 | End: 2019-12-05 | Stop reason: SDUPTHER

## 2019-07-30 NOTE — PROGRESS NOTES
Vero Livingston is a 62 y.o. male here for   Chief Complaint   Patient presents with    Diabetes    Diabetic Foot Exam    Thyroid Problem       Functional glucose monitor and record keeping system? - yes  Eye exam within last year? - on file  Foot exam within last year? - due    1. Have you been to the ER, urgent care clinic since your last visit? Hospitalized since your last visit? -no    2. Have you seen or consulted any other health care providers outside of the [address] [place] [address] since your last visit?   Includeno any pap smears or colon screening.-no

## 2019-07-30 NOTE — PROGRESS NOTES
Lani Moritz ,MD          Patient Information  Date:7/30/2019  Name : Zulema Tena 62 y.o.     YOB: 1955         Referred by: Sherryn Galeazzi, MD         Chief Complaint   Patient presents with    Diabetes    Diabetic Foot Exam    Thyroid Problem       History of Present Illness: Zulema Tena is a 62 y.o. male here for  Follow-up of  Type 2 Diabetes Mellitus. DM dx 2006   He was on sulfonylureas which was discontinued due to hypoglycemia. on januvia and metformin   LFts was mildly abnormal -on vitamin E    Checking glucose at home  Has not noticed significant hypoglycemia     Compliant with medications    No acute issues    He was referred by Dr. Deangelo Simms for the management of type 2 diabetes. He was seen by Dr. Holly Martin in 2010 for hyperthyroidism, status post radioactive iodine therapy. He is currently hypothyroid on Levothyroxine replacement. Wt Readings from Last 3 Encounters:   07/30/19 208 lb (94.3 kg)   04/16/19 212 lb (96.2 kg)   11/13/18 216 lb 14.4 oz (98.4 kg)       BP Readings from Last 3 Encounters:   07/30/19 122/58   04/16/19 145/78   11/13/18 139/64           Past Medical History:   Diagnosis Date    Diabetes ([facility] [address]     Hyperlipidemia LDL goal < 100     Hyperthyroidism 20 yrs ago    treated with MANZANARES    Hypothyroidism     s/p MANZANARES     Current Outpatient Medications   Medication Sig    lisinopril (PRINIVIL, ZESTRIL) 20 mg tablet Take 1 Tab by mouth daily.  SITagliptin (JANUVIA) 100 mg tablet Take 1 Tab by mouth every morning.  metFORMIN (GLUCOPHAGE) 1,000 mg tablet Take 1 Tab by mouth two (2) times daily (with meals).  levothyroxine (SYNTHROID) 200 mcg tablet Take 1 Tab by mouth Daily (before breakfast).  Except Sunday    lancets misc Use to test blood sugar 3 times daily Dx Code: E11.65    glucose blood VI test strips (ONETOUCH VERIO) strip Test TID Dx Code: E11.65    vitamin e (E GEMS) 100 unit capsule Take 100 Units by mouth every other day.  omega-3 fatty acids-vitamin e (FISH OIL) 1,000 mg cap Take 1 Cap by mouth daily.  POTASSIUM (POTASSIMIN PO) Take  by mouth.  aspirin (TONY CHILDRENS ASPIRIN) 81 mg chewable tablet Take 81 mg by mouth daily. Patient states three times per week    cholecalciferol, vitamin D3, (VITAMIN D3) 2,000 unit tab Take  by mouth. No current facility-administered medications for this visit. Allergies   Allergen Reactions    Glyburide Other (comments)     hypoglycemic    Crestor [Rosuvastatin] Nausea Only    Quinolones Other (comments)    Statins-Hmg-Coa Reductase Inhibitors Other (comments)    Neuromuscular Blockers, Steroidal Other (comments)     dizziness         Review of Systems:  - Constitutional Symptoms: no fevers, no chills  - Eyes: no blurry vision no double vision  - Cardiovascular: no chest pain ,no palpitations  - Respiratory: no cough no shortness of breath  - Gastrointestinal: no dysphagia no  abdominal pain  - Musculoskeletal: no joint pains no  weakness  - Integumentary: no rashes  -     Physical Examination:   Blood pressure 122/58, pulse 78, temperature 96.7 °F (35.9 °C), temperature source Oral, resp. rate 16, height 5' 8\" (1.727 m), weight 208 lb (94.3 kg), SpO2 97 %. Estimated body mass index is 31.63 kg/m² as calculated from the following:    Height as of this encounter: 5' 8\" (1.727 m). -   Weight as of this encounter: 208 lb (94.3 kg).   - General: pleasant, no distress, good eye contact  - HEENT: no pallor, no periorbital edema, EOMI  - Neck: supple, no thyromegaly  - Cardiovascular: regular, normal rate, normal S1 and S2, murmur  - Respiratory: clear to auscultation bilaterally  - Gastrointestinal: soft, nontender, nondistended,  BS +  - Musculoskeletal:  no edema,  -   Diabetic foot exam: July 2019    Left:     Vibratory sensation normal    Filament test normal sensation with micro filament   Pulse DP: 1+    Deformities: None  Right:    Vibratory sensation normal   Filament test normal sensation with micro filament   Pulse DP: 1+   Deformities: None      Data Reviewed:         Lab Results   Component Value Date/Time    Hemoglobin A1c 6.5 (H) 07/23/2019 09:14 AM    Hemoglobin A1c 6.7 (H) 04/09/2019 08:16 AM    Hemoglobin A1c 6.7 (H) 11/06/2018 08:11 AM    Glucose 123 (H) 07/23/2019 09:14 AM    Glucose POC 98 06/05/2014 09:35 AM    Microalb/Creat ratio (ug/mg creat.) 40.1 (H) 07/23/2019 09:14 AM    LDL,Direct 97 05/02/2018 08:24 AM    LDL, calculated 95 07/23/2019 09:14 AM    Creatinine 0.74 (L) 07/23/2019 09:14 AM      Lab Results   Component Value Date/Time    Cholesterol, total 163 07/23/2019 09:14 AM    HDL Cholesterol 47 07/23/2019 09:14 AM    LDL,Direct 97 05/02/2018 08:24 AM    LDL, calculated 95 07/23/2019 09:14 AM    Triglyceride 103 07/23/2019 09:14 AM     Lab Results   Component Value Date/Time    ALT (SGPT) 56 (H) 07/23/2019 09:14 AM    AST (SGOT) 21 07/23/2019 09:14 AM    Alk. phosphatase 72 07/23/2019 09:14 AM    Bilirubin, total 0.3 07/23/2019 09:14 AM     Lab Results   Component Value Date/Time    GFR est  07/23/2019 09:14 AM    GFR est non-AA 98 07/23/2019 09:14 AM    Creatinine 0.74 (L) 07/23/2019 09:14 AM    BUN 24 07/23/2019 09:14 AM    Sodium 137 07/23/2019 09:14 AM    Potassium 4.6 07/23/2019 09:14 AM    Chloride 102 07/23/2019 09:14 AM    CO2 22 07/23/2019 09:14 AM      Lab Results   Component Value Date/Time    TSH 0.587 11/06/2018 08:11 AM        Assessment/Plan:     1. Type 2 Diabetes Mellitus  Lab Results   Component Value Date/Time    Hemoglobin A1c 6.5 (H) 07/23/2019 09:14 AM    Hemoglobin A1c (POC) 6.1 06/05/2014 09:35 AM   controlled   Continue Metformin  on januvia 50 mg-   FLU annually ,Pneumovax ,aspirin daily,annual eye exam,microalbumin    2. HTN : Continue current therapy . 3. Post ablative hypothyroidism-continue levothyroxine,none  on LT4 on Sunday     4. Obesity:Body mass index is 31.63 kg/m². 5 Hyperlipidemia - diet controlled  Refused statin - understands the benefits      6 Transaminitis - NAFLD  Vit E  Hepatitis panel negative      Plantar fascitis - had surgery 25 years ago     Thank you for allowing me to participate in the care of this patient.     Kevin Retana MD    Patient verbalized understanding

## 2019-07-30 NOTE — LETTER
7/30/19 Patient: Jenise Harvey YOB: 1955 Date of Visit: 7/30/2019 Nadege Burk MD 
[address] [address] VIA Facsimile: [phone] Dear Nadege Burk MD, Thank you for referring Mr. Jessica Hernández to [address] [place] [address] for evaluation. My notes for this consultation are attached. If you have questions, please do not hesitate to call me. I look forward to following your patient along with you. Sincerely, Rupali Danielle MD

## 2019-11-25 ENCOUNTER — OP HISTORICAL/CONVERTED ENCOUNTER (OUTPATIENT)
Dept: OTHER | Age: 64
End: 2019-11-25

## 2019-11-25 ENCOUNTER — HOSPITAL ENCOUNTER (OUTPATIENT)
Dept: LAB | Age: 64
Discharge: HOME | End: 2019-11-25

## 2019-11-25 ENCOUNTER — LAB ONLY (OUTPATIENT)
Dept: ENDOCRINOLOGY | Age: 64
End: 2019-11-25

## 2019-11-25 DIAGNOSIS — E11.65 TYPE 2 DIABETES MELLITUS WITH HYPERGLYCEMIA, WITHOUT LONG-TERM CURRENT USE OF INSULIN (HCC): Primary | ICD-10-CM

## 2019-11-25 DIAGNOSIS — E11.65 TYPE 2 DIABETES MELLITUS WITH HYPERGLYCEMIA, WITHOUT LONG-TERM CURRENT USE OF INSULIN (HCC): ICD-10-CM

## 2019-11-25 LAB
EST. AVERAGE GLUCOSE BLD GHB EST-MCNC: 134 MG/DL
HBA1C MFR BLD: 6.3 % (ref 4–5.6)

## 2019-12-05 ENCOUNTER — OFFICE VISIT (OUTPATIENT)
Dept: ENDOCRINOLOGY | Age: 64
End: 2019-12-05

## 2019-12-05 VITALS
DIASTOLIC BLOOD PRESSURE: 60 MMHG | SYSTOLIC BLOOD PRESSURE: 111 MMHG | RESPIRATION RATE: 12 BRPM | HEART RATE: 76 BPM | BODY MASS INDEX: 31.48 KG/M2 | TEMPERATURE: 97 F | WEIGHT: 207.7 LBS | OXYGEN SATURATION: 97 % | HEIGHT: 68 IN

## 2019-12-05 DIAGNOSIS — I10 ESSENTIAL HYPERTENSION: ICD-10-CM

## 2019-12-05 DIAGNOSIS — E11.65 TYPE 2 DIABETES MELLITUS WITH HYPERGLYCEMIA, WITHOUT LONG-TERM CURRENT USE OF INSULIN (HCC): ICD-10-CM

## 2019-12-05 DIAGNOSIS — E78.2 MIXED HYPERLIPIDEMIA: ICD-10-CM

## 2019-12-05 DIAGNOSIS — E11.65 TYPE 2 DIABETES MELLITUS WITH HYPERGLYCEMIA, WITHOUT LONG-TERM CURRENT USE OF INSULIN (HCC): Primary | ICD-10-CM

## 2019-12-05 RX ORDER — LEVOTHYROXINE SODIUM 200 UG/1
200 TABLET ORAL
Qty: 90 TAB | Refills: 3 | Status: SHIPPED | OUTPATIENT
Start: 2019-12-05 | End: 2020-04-22 | Stop reason: SDUPTHER

## 2019-12-05 RX ORDER — LISINOPRIL 20 MG/1
20 TABLET ORAL DAILY
Qty: 90 TAB | Refills: 3 | Status: SHIPPED | OUTPATIENT
Start: 2019-12-05 | End: 2020-04-22 | Stop reason: SDUPTHER

## 2019-12-05 RX ORDER — LANCETS
EACH MISCELLANEOUS
Qty: 300 EACH | Refills: 3 | Status: SHIPPED | OUTPATIENT
Start: 2019-12-05 | End: 2020-12-14 | Stop reason: SDUPTHER

## 2019-12-05 RX ORDER — METFORMIN HYDROCHLORIDE 1000 MG/1
1000 TABLET ORAL 2 TIMES DAILY WITH MEALS
Qty: 180 TAB | Refills: 3 | Status: SHIPPED | OUTPATIENT
Start: 2019-12-05 | End: 2020-04-22 | Stop reason: SDUPTHER

## 2019-12-05 NOTE — PROGRESS NOTES
Mamadou Olson is a 62 y.o. male here for   Chief Complaint   Patient presents with    Diabetes       Functional glucose monitor and record keeping system? - yes  Eye exam within last year? - on file  Foot exam within last year? - on [id number]    1. Have you been to the ER, urgent care clinic since your last visit? Hospitalized since your last visit? - no    2. Have you seen or consulted any other health care providers outside of the [address] [place] [address] since your last visit?   Include any pap smears or colon screening.- foot doctor

## 2019-12-05 NOTE — LETTER
12/7/19 Patient: Hines Duane YOB: 1955 Date of Visit: 12/5/2019 Den Winter MD 
[address] [place], [place] [id number] VIA Facsimile: [phone] Dear Den Winter MD, Thank you for referring Mr. Elgin Aquino to [address] [place] [address] for evaluation. My notes for this consultation are attached. If you have questions, please do not hesitate to call me. I look forward to following your patient along with you. Sincerely, Bobby Osborne MD

## 2019-12-05 NOTE — PROGRESS NOTES
Peter Park MD          Patient Information  Date:12/5/2019  Name : Tonya Pink 62 y.o.     YOB: 1955         Referred by: Katlyn Swenson MD         Chief Complaint   Patient presents with    Diabetes       History of Present Illness: Tonya Pink is a 62 y.o. male here for  Follow-up of  Type 2 Diabetes Mellitus. DM dx 2006   He was on sulfonylureas which was discontinued due to hypoglycemia. on januvia and metformin   LFts was mildly abnormal -on vitamin E    Checking glucose at home  Has not noticed significant hypoglycemia     Compliant with medications    No acute issues    He was referred by Dr. Edmund Edwards for the management of type 2 diabetes. He was seen by Dr. Floridalma Pink in 2010 for hyperthyroidism, status post radioactive iodine therapy. He is currently hypothyroid on Levothyroxine replacement. Wt Readings from Last 3 Encounters:   07/30/19 208 lb (94.3 kg)   04/16/19 212 lb (96.2 kg)   11/13/18 216 lb 14.4 oz (98.4 kg)       BP Readings from Last 3 Encounters:   07/30/19 122/58   04/16/19 145/78   11/13/18 139/64           Past Medical History:   Diagnosis Date    [facility]     Hyperlipidemia LDL goal < 100     Hyperthyroidism 20 yrs ago    treated with MANZANARES    Hypothyroidism     s/p MANZANARES     Current Outpatient Medications   Medication Sig    SITagliptin (JANUVIA) 100 mg tablet Take 1 Tab by mouth every morning.  metFORMIN (GLUCOPHAGE) 1,000 mg tablet Take 1 Tab by mouth two (2) times daily (with meals).  lisinopril (PRINIVIL, ZESTRIL) 20 mg tablet Take 1 Tab by mouth daily.  levothyroxine (SYNTHROID) 200 mcg tablet Take 1 Tab by mouth Daily (before breakfast).  Except Sunday    lancets misc Use to test blood sugar 3 times daily Dx Code: E11.65    glucose blood VI test strips (ONETOUCH VERIO) strip Test TID Dx Code: E11.65    vitamin e (E GEMS) 100 unit capsule Take 100 Units by mouth every other day.  omega-3 fatty acids-vitamin e (FISH OIL) 1,000 mg cap Take 1 Cap by mouth daily.  POTASSIUM (POTASSIMIN PO) Take  by mouth.  cholecalciferol, vitamin D3, (VITAMIN D3) 2,000 unit tab Take  by mouth.  aspirin (TONY CHILDRENS ASPIRIN) 81 mg chewable tablet Take 81 mg by mouth daily. Patient states three times per week     No current facility-administered medications for this visit. Allergies   Allergen Reactions    Glyburide Other (comments)     hypoglycemic    Crestor [Rosuvastatin] Nausea Only    Quinolones Other (comments)    Statins-Hmg-Coa Reductase Inhibitors Other (comments)    Neuromuscular Blockers, Steroidal Other (comments)     dizziness         Review of Systems:  - Constitutional Symptoms: no fevers, no chills  - Eyes: no blurry vision no double vision  - Cardiovascular: no chest pain ,no palpitations  - Respiratory: no cough no shortness of breath  - Gastrointestinal: no dysphagia no  abdominal pain  - Musculoskeletal: no joint pains no  weakness  - Integumentary: no rashes  -     Physical Examination:   Height 5' 8\" (1.727 m). Estimated body mass index is 31.63 kg/m² as calculated from the following:    Height as of this encounter: 5' 8\" (1.727 m). -   Weight as of 7/30/19: 208 lb (94.3 kg).   - General: pleasant, no distress, good eye contact  - HEENT: no pallor, no periorbital edema, EOMI  - Neck: supple, no thyromegaly  - Cardiovascular: regular, normal rate, normal S1 and S2, murmur  - Respiratory: clear to auscultation bilaterally  - Gastrointestinal: soft, nontender, nondistended,  BS +  - Musculoskeletal:  no edema,  -   Diabetic foot exam: July 2019    Left:     Vibratory sensation normal    Filament test normal sensation with micro filament   Pulse DP: 1+    Deformities: None  Right:    Vibratory sensation normal   Filament test normal sensation with micro filament   Pulse DP: 1+   Deformities: None      Data Reviewed:         Lab Results Component Value Date/Time    Hemoglobin A1c 6.3 (H) 11/25/2019 10:08 AM    Hemoglobin A1c 6.5 (H) 07/23/2019 09:14 AM    Hemoglobin A1c 6.7 (H) 04/09/2019 08:16 AM    Glucose 123 (H) 07/23/2019 09:14 AM    Glucose POC 98 06/05/2014 09:35 AM    Microalb/Creat ratio (ug/mg creat.) 40.1 (H) 07/23/2019 09:14 AM    LDL,Direct 97 05/02/2018 08:24 AM    LDL, calculated 95 07/23/2019 09:14 AM    Creatinine 0.74 (L) 07/23/2019 09:14 AM      Lab Results   Component Value Date/Time    Cholesterol, total 163 07/23/2019 09:14 AM    HDL Cholesterol 47 07/23/2019 09:14 AM    LDL,Direct 97 05/02/2018 08:24 AM    LDL, calculated 95 07/23/2019 09:14 AM    Triglyceride 103 07/23/2019 09:14 AM     Lab Results   Component Value Date/Time    ALT (SGPT) 56 (H) 07/23/2019 09:14 AM    AST (SGOT) 21 07/23/2019 09:14 AM    Alk. phosphatase 72 07/23/2019 09:14 AM    Bilirubin, total 0.3 07/23/2019 09:14 AM     Lab Results   Component Value Date/Time    GFR est  07/23/2019 09:14 AM    GFR est non-AA 98 07/23/2019 09:14 AM    Creatinine 0.74 (L) 07/23/2019 09:14 AM    BUN 24 07/23/2019 09:14 AM    Sodium 137 07/23/2019 09:14 AM    Potassium 4.6 07/23/2019 09:14 AM    Chloride 102 07/23/2019 09:14 AM    CO2 22 07/23/2019 09:14 AM      Lab Results   Component Value Date/Time    TSH 0.587 11/06/2018 08:11 AM        Assessment/Plan:     1. Type 2 Diabetes Mellitus  Lab Results   Component Value Date/Time    Hemoglobin A1c 6.3 (H) 11/25/2019 10:08 AM    Hemoglobin A1c (POC) 6.1 06/05/2014 09:35 AM   controlled   Continue Metformin  on januvia 100 mg-   FLU annually ,Pneumovax ,aspirin daily,annual eye exam,microalbumin    2. HTN : Continue current therapy . 3. Post ablative hypothyroidism-continue levothyroxine,none  on LT4 on Sunday     4. Obesity:Body mass index is 31.63 kg/m².     5 Hyperlipidemia - diet controlled  Refused statin - understands the benefits      6 Transaminitis - NAFLD  Vit E  Hepatitis panel negative      Plantar fascitis - had surgery 25 years ago     Thank you for allowing me to participate in the care of this patient.     Linda Dubon MD    Patient verbalized understanding

## 2020-04-03 ENCOUNTER — TELEPHONE (OUTPATIENT)
Dept: ENDOCRINOLOGY | Age: 65
End: 2020-04-03

## 2020-04-03 DIAGNOSIS — E11.65 TYPE 2 DIABETES MELLITUS WITH HYPERGLYCEMIA, WITHOUT LONG-TERM CURRENT USE OF INSULIN (HCC): Primary | ICD-10-CM

## 2020-04-06 ENCOUNTER — TELEPHONE (OUTPATIENT)
Dept: ENDOCRINOLOGY | Age: 65
End: 2020-04-06

## 2020-04-14 LAB
BUN SERPL-MCNC: 23 MG/DL (ref 8–27)
BUN/CREAT SERPL: 28 (ref 10–24)
CALCIUM SERPL-MCNC: 9.4 MG/DL (ref 8.6–10.2)
CHLORIDE SERPL-SCNC: 101 MMOL/L (ref 96–106)
CO2 SERPL-SCNC: 19 MMOL/L (ref 20–29)
CREAT SERPL-MCNC: 0.82 MG/DL (ref 0.76–1.27)
EST. AVERAGE GLUCOSE BLD GHB EST-MCNC: 148 MG/DL
GLUCOSE SERPL-MCNC: 138 MG/DL (ref 65–99)
HBA1C MFR BLD: 6.8 % (ref 4.8–5.6)
LDLC SERPL DIRECT ASSAY-MCNC: 89 MG/DL (ref 0–99)
POTASSIUM SERPL-SCNC: 4.8 MMOL/L (ref 3.5–5.2)
SODIUM SERPL-SCNC: 136 MMOL/L (ref 134–144)

## 2020-04-22 ENCOUNTER — VIRTUAL VISIT (OUTPATIENT)
Dept: ENDOCRINOLOGY | Age: 65
End: 2020-04-22

## 2020-04-22 DIAGNOSIS — E78.2 MIXED HYPERLIPIDEMIA: ICD-10-CM

## 2020-04-22 DIAGNOSIS — E11.65 TYPE 2 DIABETES MELLITUS WITH HYPERGLYCEMIA, WITHOUT LONG-TERM CURRENT USE OF INSULIN (HCC): Primary | ICD-10-CM

## 2020-04-22 DIAGNOSIS — I10 ESSENTIAL HYPERTENSION: ICD-10-CM

## 2020-04-22 NOTE — PROGRESS NOTES
Teofilo Zarate is a 62 y.o. male here for   Chief Complaint   Patient presents with    Diabetes    Thyroid Problem     Pt consented to virtual visit. 1. Have you been to the ER, urgent care clinic since your last visit? Hospitalized since your last visit? -no    2. Have you seen or consulted any other health care providers outside of the [address] [place] [address] since your last visit? Include any pap smears or colon screening. -PCP VV

## 2020-04-22 NOTE — PROGRESS NOTES
Ron York MD          Patient Information  Date:4/22/2020  Name : Kenia Knight 62 y.o.     YOB: 1955         Referred by: Ryan Tian MD         Chief Complaint   Patient presents with    Diabetes    Thyroid Problem     Pursuant to the emergency declaration under the [facility] [address] [facility] authority and the [employer] Act, this Virtual  Visit was conducted, with patient's consent, to reduce the patient's risk of exposure to COVID-19 . Patient  is aware that this is a billable encounter and is responsible for copays/deductibles       Services were provided through a video synchronous discussion virtually to substitute for in-person clinic visit. Place of service: Provider : Office  Patient: Home  History of Present Illness: Kenia Knight is a 62 y.o. male here for  Follow-up of  Type 2 Diabetes Mellitus. DM dx 2006   He was on sulfonylureas which was discontinued due to hypoglycemia. on januvia and metformin  Limited activity  Fasting blood glucose are higher lately  LFts was mildly abnormal -on vitamin E    Checking glucose at home  Compliant with the medications    He was referred by Dr. Bobo Gonzalez for the management of type 2 diabetes. He was seen by Dr. River Ferrera in 2010 for hyperthyroidism, status post radioactive iodine therapy. He is currently hypothyroid on Levothyroxine replacement.                 Wt Readings from Last 3 Encounters:   12/05/19 207 lb 11.2 oz (94.2 kg)   07/30/19 208 lb (94.3 kg)   04/16/19 212 lb (96.2 kg)       BP Readings from Last 3 Encounters:   12/05/19 111/60   07/30/19 122/58   04/16/19 145/78           Past Medical History:   Diagnosis Date    [facility] ([facility]     Hyperlipidemia LDL goal < 100     Hyperthyroidism 20 yrs ago    treated with MANZANARES    Hypothyroidism     s/p MANZANARES     Current Outpatient Medications   Medication Sig    SITagliptin (JANUVIA) 100 mg tablet Take 1 Tab by mouth every morning.  metFORMIN (GLUCOPHAGE) 1,000 mg tablet Take 1 Tab by mouth two (2) times daily (with meals).  lisinopril (PRINIVIL, ZESTRIL) 20 mg tablet Take 1 Tab by mouth daily.  levothyroxine (SYNTHROID) 200 mcg tablet Take 1 Tab by mouth Daily (before breakfast). Except Sunday    lancets misc Use to test blood sugar 3 times daily Dx Code: E11.65    glucose blood VI test strips (ONETOUCH VERIO) strip Test TID Dx Code: E11.65    vitamin e (E GEMS) 100 unit capsule Take 100 Units by mouth every other day.  omega-3 fatty acids-vitamin e (FISH OIL) 1,000 mg cap Take 1 Cap by mouth daily.  POTASSIUM (POTASSIMIN PO) Take  by mouth.  aspirin (TONY CHILDRENS ASPIRIN) 81 mg chewable tablet Take 81 mg by mouth daily. Patient states three times per week    cholecalciferol, vitamin D3, (VITAMIN D3) 2,000 unit tab Take  by mouth. No current facility-administered medications for this visit. Allergies   Allergen Reactions    Glyburide Other (comments)     hypoglycemic    Crestor [Rosuvastatin] Nausea Only    Quinolones Other (comments)    Statins-Hmg-Coa Reductase Inhibitors Other (comments)    Neuromuscular Blockers, Steroidal Other (comments)     dizziness         Review of Systems:  - Constitutional Symptoms: no fevers, no chills  - Eyes: no blurry vision no double vision  - Cardiovascular: no chest pain ,no palpitations  - Respiratory: no cough no shortness of breath  - Gastrointestinal: no dysphagia no  abdominal pain  - Musculoskeletal: no joint pains no  weakness  - Integumentary: no rashes  -     Physical Examination:   There were no vitals taken for this visit. Estimated body mass index is 31.58 kg/m² as calculated from the following:    Height as of 12/5/19: 5' 8\" (1.727 m). -   Weight as of 12/5/19: 207 lb 11.2 oz (94.2 kg).   - General: pleasant, no distress, good eye contact  - HEENT: no exophthalmos, no periorbital edema, EOMI  - Neck: No visible thyromegaly  - RS: Normal respiratory effort  - Musculoskeletal: no tremors  - Neurological: alert and oriented  - Psychiatric: normal mood and affect  - Skin: Normal color  -   Diabetic foot exam: July 2019    Left:     Vibratory sensation normal    Filament test normal sensation with micro filament   Pulse DP: 1+    Deformities: None  Right:    Vibratory sensation normal   Filament test normal sensation with micro filament   Pulse DP: 1+   Deformities: None      Data Reviewed:         Lab Results   Component Value Date/Time    Hemoglobin A1c 6.8 (H) 04/13/2020 08:15 AM    Hemoglobin A1c 6.3 (H) 11/25/2019 10:08 AM    Hemoglobin A1c 6.5 (H) 07/23/2019 09:14 AM    Glucose 138 (H) 04/13/2020 08:15 AM    Glucose POC 98 06/05/2014 09:35 AM    Microalb/Creat ratio (ug/mg creat.) 40.1 (H) 07/23/2019 09:14 AM    LDL,Direct 89 04/13/2020 08:15 AM    LDL, calculated 95 07/23/2019 09:14 AM    Creatinine 0.82 04/13/2020 08:15 AM      Lab Results   Component Value Date/Time    Cholesterol, total 163 07/23/2019 09:14 AM    HDL Cholesterol 47 07/23/2019 09:14 AM    LDL,Direct 89 04/13/2020 08:15 AM    LDL, calculated 95 07/23/2019 09:14 AM    Triglyceride 103 07/23/2019 09:14 AM     Lab Results   Component Value Date/Time    ALT (SGPT) 56 (H) 07/23/2019 09:14 AM    AST (SGOT) 21 07/23/2019 09:14 AM    Alk. phosphatase 72 07/23/2019 09:14 AM    Bilirubin, total 0.3 07/23/2019 09:14 AM     Lab Results   Component Value Date/Time    GFR est  04/13/2020 08:15 AM    GFR est non-AA 94 04/13/2020 08:15 AM    Creatinine 0.82 04/13/2020 08:15 AM    BUN 23 04/13/2020 08:15 AM    Sodium 136 04/13/2020 08:15 AM    Potassium 4.8 04/13/2020 08:15 AM    Chloride 101 04/13/2020 08:15 AM    CO2 19 (L) 04/13/2020 08:15 AM      Lab Results   Component Value Date/Time    TSH 0.587 11/06/2018 08:11 AM        Assessment/Plan:     1.  Type 2 Diabetes Mellitus  Lab Results   Component Value Date/Time    Hemoglobin A1c 6.8 (H) 04/13/2020 08:15 AM    Hemoglobin A1c (POC) 6.1 06/05/2014 09:35 AM   controlled for now  Blood glucose trending higher, to increase activity  Continue Metformin  on januvia 100 mg-   FLU annually ,Pneumovax ,aspirin daily,annual eye exam,microalbumin    2. HTN : Continue current therapy . 3. Post ablative hypothyroidism-continue levothyroxine,none  on LT4 on Sunday     4. Obesity:There is no height or weight on file to calculate BMI.    5 Hyperlipidemia - diet controlled  Refused statin - understands the benefits      6 Transaminitis - NAFLD  Vit E  Hepatitis panel negative      Plantar fascitis - had surgery 25 years ago     Thank you for allowing me to participate in the care of this patient. Lucrecia Weinberg MD    Patient verbalized understanding    Voice-recognition software was used to generate this report, which may result in some phonetic-based errors in the grammar and contents. Even though attempts were made to correct all the mistakes, some may have been missed and remained in the body of the report.

## 2020-04-22 NOTE — PROGRESS NOTES
Javier Morejon MD          Patient Information  Date:4/22/2020  Name : Xu Terrell 62 y.o.     YOB: 1955         Referred by: Evelia Arciniega MD         Chief Complaint   Patient presents with    Diabetes    Thyroid Problem       History of Present Illness: Xu Terrell is a 62 y.o. male here for  Follow-up of  Type 2 Diabetes Mellitus. DM dx 2006   He was on sulfonylureas which was discontinued due to hypoglycemia. on januvia and metformin   LFts was mildly abnormal -on vitamin E    Checking glucose at home  Has not noticed significant hypoglycemia     Compliant with medications    No acute issues    He was referred by Dr. Floresita Dunaway for the management of type 2 diabetes. He was seen by Dr. Darylene Arabia in 2010 for hyperthyroidism, status post radioactive iodine therapy. He is currently hypothyroid on Levothyroxine replacement. Wt Readings from Last 3 Encounters:   12/05/19 207 lb 11.2 oz (94.2 kg)   07/30/19 208 lb (94.3 kg)   04/16/19 212 lb (96.2 kg)       BP Readings from Last 3 Encounters:   12/05/19 111/60   07/30/19 122/58   04/16/19 145/78           Past Medical History:   Diagnosis Date    [facility] ([facility]     Hyperlipidemia LDL goal < 100     Hyperthyroidism 20 yrs ago    treated with MANZANARES    Hypothyroidism     s/p MANZANARES     Current Outpatient Medications   Medication Sig    SITagliptin (JANUVIA) 100 mg tablet Take 1 Tab by mouth every morning.  metFORMIN (GLUCOPHAGE) 1,000 mg tablet Take 1 Tab by mouth two (2) times daily (with meals).  lisinopril (PRINIVIL, ZESTRIL) 20 mg tablet Take 1 Tab by mouth daily.  levothyroxine (SYNTHROID) 200 mcg tablet Take 1 Tab by mouth Daily (before breakfast).  Except Sunday    lancets misc Use to test blood sugar 3 times daily Dx Code: E11.65    glucose blood VI test strips (ONETOUCH VERIO) strip Test TID Dx Code: E11.65    vitamin e (E GEMS) 100 unit capsule Take 100 Units by mouth every other day.  omega-3 fatty acids-vitamin e (FISH OIL) 1,000 mg cap Take 1 Cap by mouth daily.  POTASSIUM (POTASSIMIN PO) Take  by mouth.  aspirin (TONY CHILDRENS ASPIRIN) 81 mg chewable tablet Take 81 mg by mouth daily. Patient states three times per week    cholecalciferol, vitamin D3, (VITAMIN D3) 2,000 unit tab Take  by mouth. No current facility-administered medications for this visit. Allergies   Allergen Reactions    Glyburide Other (comments)     hypoglycemic    Crestor [Rosuvastatin] Nausea Only    Quinolones Other (comments)    Statins-Hmg-Coa Reductase Inhibitors Other (comments)    Neuromuscular Blockers, Steroidal Other (comments)     dizziness         Review of Systems:  - Constitutional Symptoms: no fevers, no chills  - Eyes: no blurry vision no double vision  - Cardiovascular: no chest pain ,no palpitations  - Respiratory: no cough no shortness of breath  - Gastrointestinal: no dysphagia no  abdominal pain  - Musculoskeletal: no joint pains no  weakness  - Integumentary: no rashes  -     Physical Examination:   There were no vitals taken for this visit. Estimated body mass index is 31.58 kg/m² as calculated from the following:    Height as of 12/5/19: 5' 8\" (1.727 m). -   Weight as of 12/5/19: 207 lb 11.2 oz (94.2 kg).   - General: pleasant, no distress, good eye contact  - HEENT: no pallor, no periorbital edema, EOMI  - Neck: supple, no thyromegaly  - Cardiovascular: regular, normal rate, normal S1 and S2, murmur  - Respiratory: clear to auscultation bilaterally  - Gastrointestinal: soft, nontender, nondistended,  BS +  - Musculoskeletal:  no edema,  -   Diabetic foot exam: July 2019    Left:     Vibratory sensation normal    Filament test normal sensation with micro filament   Pulse DP: 1+    Deformities: None  Right:    Vibratory sensation normal   Filament test normal sensation with micro filament   Pulse DP: 1+   Deformities: None      Data Reviewed:         Lab Results   Component Value Date/Time    Hemoglobin A1c 6.8 (H) 04/13/2020 08:15 AM    Hemoglobin A1c 6.3 (H) 11/25/2019 10:08 AM    Hemoglobin A1c 6.5 (H) 07/23/2019 09:14 AM    Glucose 138 (H) 04/13/2020 08:15 AM    Glucose POC 98 06/05/2014 09:35 AM    Microalb/Creat ratio (ug/mg creat.) 40.1 (H) 07/23/2019 09:14 AM    LDL,Direct 89 04/13/2020 08:15 AM    LDL, calculated 95 07/23/2019 09:14 AM    Creatinine 0.82 04/13/2020 08:15 AM      Lab Results   Component Value Date/Time    Cholesterol, total 163 07/23/2019 09:14 AM    HDL Cholesterol 47 07/23/2019 09:14 AM    LDL,Direct 89 04/13/2020 08:15 AM    LDL, calculated 95 07/23/2019 09:14 AM    Triglyceride 103 07/23/2019 09:14 AM     Lab Results   Component Value Date/Time    ALT (SGPT) 56 (H) 07/23/2019 09:14 AM    AST (SGOT) 21 07/23/2019 09:14 AM    Alk. phosphatase 72 07/23/2019 09:14 AM    Bilirubin, total 0.3 07/23/2019 09:14 AM     Lab Results   Component Value Date/Time    GFR est  04/13/2020 08:15 AM    GFR est non-AA 94 04/13/2020 08:15 AM    Creatinine 0.82 04/13/2020 08:15 AM    BUN 23 04/13/2020 08:15 AM    Sodium 136 04/13/2020 08:15 AM    Potassium 4.8 04/13/2020 08:15 AM    Chloride 101 04/13/2020 08:15 AM    CO2 19 (L) 04/13/2020 08:15 AM      Lab Results   Component Value Date/Time    TSH 0.587 11/06/2018 08:11 AM        Assessment/Plan:     1. Type 2 Diabetes Mellitus  Lab Results   Component Value Date/Time    Hemoglobin A1c 6.8 (H) 04/13/2020 08:15 AM    Hemoglobin A1c (POC) 6.1 06/05/2014 09:35 AM   controlled   Continue Metformin  on januvia 100 mg-   FLU annually ,Pneumovax ,aspirin daily,annual eye exam,microalbumin    2. HTN : Continue current therapy . 3. Post ablative hypothyroidism-continue levothyroxine,none  on LT4 on Sunday     4. Obesity:There is no height or weight on file to calculate BMI.    5 Hyperlipidemia - diet controlled  Refused statin - understands the benefits      6 Transaminitis - NAFLD  Vit E  Hepatitis panel negative      Plantar fascitis - had surgery 25 years ago     Thank you for allowing me to participate in the care of this patient.     Madison Bianchi MD    Patient verbalized understanding

## 2020-04-24 RX ORDER — METFORMIN HYDROCHLORIDE 1000 MG/1
1000 TABLET ORAL 2 TIMES DAILY WITH MEALS
Qty: 180 TAB | Refills: 3 | Status: SHIPPED | OUTPATIENT
Start: 2020-04-24 | End: 2020-12-14 | Stop reason: SDUPTHER

## 2020-04-24 RX ORDER — LISINOPRIL 20 MG/1
20 TABLET ORAL DAILY
Qty: 90 TAB | Refills: 3 | Status: SHIPPED | OUTPATIENT
Start: 2020-04-24 | End: 2020-12-14 | Stop reason: SDUPTHER

## 2020-04-24 RX ORDER — LEVOTHYROXINE SODIUM 200 UG/1
200 TABLET ORAL
Qty: 90 TAB | Refills: 3 | Status: SHIPPED | OUTPATIENT
Start: 2020-04-24 | End: 2020-12-14 | Stop reason: SDUPTHER

## 2020-08-18 LAB
BUN SERPL-MCNC: 15 MG/DL (ref 8–27)
BUN/CREAT SERPL: 19 (ref 10–24)
CALCIUM SERPL-MCNC: 9.3 MG/DL (ref 8.6–10.2)
CHLORIDE SERPL-SCNC: 101 MMOL/L (ref 96–106)
CO2 SERPL-SCNC: 23 MMOL/L (ref 20–29)
CREAT SERPL-MCNC: 0.79 MG/DL (ref 0.76–1.27)
EST. AVERAGE GLUCOSE BLD GHB EST-MCNC: 151 MG/DL
GLUCOSE SERPL-MCNC: 135 MG/DL (ref 65–99)
HBA1C MFR BLD: 6.9 % (ref 4.8–5.6)
POTASSIUM SERPL-SCNC: 4.6 MMOL/L (ref 3.5–5.2)
SODIUM SERPL-SCNC: 137 MMOL/L (ref 134–144)

## 2020-08-24 ENCOUNTER — OFFICE VISIT (OUTPATIENT)
Dept: ENDOCRINOLOGY | Age: 65
End: 2020-08-24
Payer: COMMERCIAL

## 2020-08-24 VITALS
HEART RATE: 74 BPM | WEIGHT: 213 LBS | TEMPERATURE: 96.5 F | HEIGHT: 68 IN | SYSTOLIC BLOOD PRESSURE: 138 MMHG | BODY MASS INDEX: 32.28 KG/M2 | OXYGEN SATURATION: 96 % | DIASTOLIC BLOOD PRESSURE: 59 MMHG | RESPIRATION RATE: 20 BRPM

## 2020-08-24 DIAGNOSIS — E78.2 MIXED HYPERLIPIDEMIA: ICD-10-CM

## 2020-08-24 DIAGNOSIS — I10 ESSENTIAL HYPERTENSION: ICD-10-CM

## 2020-08-24 DIAGNOSIS — E03.4 HYPOTHYROIDISM DUE TO ACQUIRED ATROPHY OF THYROID: ICD-10-CM

## 2020-08-24 DIAGNOSIS — E11.65 TYPE 2 DIABETES MELLITUS WITH HYPERGLYCEMIA, WITHOUT LONG-TERM CURRENT USE OF INSULIN (HCC): Primary | ICD-10-CM

## 2020-08-24 PROCEDURE — 99214 OFFICE O/P EST MOD 30 MIN: CPT | Performed by: INTERNAL MEDICINE

## 2020-08-24 NOTE — PROGRESS NOTES
Lyndsey Villa MD          Patient Information  Date:8/24/2020  Name : Андрей Moreland 60 y.o.     YOB: 1955         Referred by: Viri Neri MD         Chief Complaint   Patient presents with    Diabetes       History of Present Illness: Андрей Moreland is a 60 y.o. male here for  Follow-up of  Type 2 Diabetes Mellitus. DM dx 2006   He was on sulfonylureas which was discontinued due to hypoglycemia. on januvia and metformin  He has gained some weight  Fasting blood glucose are higher lately  LFts was mildly abnormal -on vitamin E    Checking glucose at home  Compliant with the medications    He was referred by Dr. Kar Montanez for the management of type 2 diabetes. He was seen by Dr. Memory Lennox in 2010 for hyperthyroidism, status post radioactive iodine therapy. He is currently hypothyroid on Levothyroxine replacement. Wt Readings from Last 3 Encounters:   08/24/20 213 lb (96.6 kg)   12/05/19 207 lb 11.2 oz (94.2 kg)   07/30/19 208 lb (94.3 kg)       BP Readings from Last 3 Encounters:   08/24/20 138/59   12/05/19 111/60   07/30/19 122/58           Past Medical History:   Diagnosis Date    Diabetes ([facility] [address]     Hyperlipidemia LDL goal < 100     Hyperthyroidism 20 yrs ago    treated with MANZANARES    Hypothyroidism     s/p MANZANARES     Current Outpatient Medications   Medication Sig    SITagliptin (JANUVIA) 100 mg tablet Take 1 Tab by mouth every morning.  metFORMIN (GLUCOPHAGE) 1,000 mg tablet Take 1 Tab by mouth two (2) times daily (with meals).  lisinopriL (PRINIVIL, ZESTRIL) 20 mg tablet Take 1 Tab by mouth daily.  levothyroxine (SYNTHROID) 200 mcg tablet Take 1 Tab by mouth Daily (before breakfast).  Except Sunday    lancets misc Use to test blood sugar 3 times daily Dx Code: E11.65    glucose blood VI test strips (ONETOUCH VERIO) strip Test TID Dx Code: E11.65    vitamin e (E GEMS) 100 unit capsule Take 100 Units by mouth every other day.  omega-3 fatty acids-vitamin e (FISH OIL) 1,000 mg cap Take 1 Cap by mouth daily.  POTASSIUM (POTASSIMIN PO) Take  by mouth.  aspirin (TONY CHILDRENS ASPIRIN) 81 mg chewable tablet Take 81 mg by mouth daily. Patient states three times per week    cholecalciferol, vitamin D3, (VITAMIN D3) 2,000 unit tab Take  by mouth. No current facility-administered medications for this visit. Allergies   Allergen Reactions    Glyburide Other (comments)     hypoglycemic    Crestor [Rosuvastatin] Nausea Only    Quinolones Other (comments)    Statins-Hmg-Coa Reductase Inhibitors Other (comments)    Neuromuscular Blockers, Steroidal Other (comments)     dizziness         Review of Systems:  - Constitutional Symptoms: no fevers, no chills  - Eyes: no blurry vision no double vision  - Cardiovascular: no chest pain ,no palpitations  - Respiratory: no cough no shortness of breath  - Gastrointestinal: no dysphagia no  abdominal pain  - Musculoskeletal: no joint pains no  weakness  - Integumentary: no rashes  -     Physical Examination:   Blood pressure 138/59, pulse 74, temperature (!) 96.5 °F (35.8 °C), temperature source Oral, resp. rate 20, height 5' 8\" (1.727 m), weight 213 lb (96.6 kg), SpO2 96 %. Estimated body mass index is 32.39 kg/m² as calculated from the following:    Height as of this encounter: 5' 8\" (1.727 m). -   Weight as of this encounter: 213 lb (96.6 kg).   - General: pleasant, no distress, good eye contact  - HEENT: no exophthalmos, no periorbital edema, EOMI  - Neck: No visible thyromegaly  - RS: Normal respiratory effort  - Musculoskeletal: no tremors  - Neurological: alert and oriented  - Psychiatric: normal mood and affect  - Skin: Normal color  -   Diabetic foot exam: July 2019    Left:     Vibratory sensation normal    Filament test normal sensation with micro filament   Pulse DP: 1+    Deformities: None  Right:    Vibratory sensation normal   Filament test normal sensation with micro filament   Pulse DP: 1+   Deformities: None      Data Reviewed:         Lab Results   Component Value Date/Time    Hemoglobin A1c 6.9 (H) 08/17/2020 08:57 AM    Hemoglobin A1c 6.8 (H) 04/13/2020 08:15 AM    Hemoglobin A1c 6.3 (H) 11/25/2019 10:08 AM    Glucose 135 (H) 08/17/2020 08:57 AM    Glucose POC 98 06/05/2014 09:35 AM    Microalb/Creat ratio (ug/mg creat.) 40.1 (H) 07/23/2019 09:14 AM    LDL,Direct 89 04/13/2020 08:15 AM    LDL, calculated 95 07/23/2019 09:14 AM    Creatinine 0.79 08/17/2020 08:57 AM      Lab Results   Component Value Date/Time    Cholesterol, total 163 07/23/2019 09:14 AM    HDL Cholesterol 47 07/23/2019 09:14 AM    LDL,Direct 89 04/13/2020 08:15 AM    LDL, calculated 95 07/23/2019 09:14 AM    Triglyceride 103 07/23/2019 09:14 AM     Lab Results   Component Value Date/Time    ALT (SGPT) 56 (H) 07/23/2019 09:14 AM    Alk. phosphatase 72 07/23/2019 09:14 AM    Bilirubin, total 0.3 07/23/2019 09:14 AM     Lab Results   Component Value Date/Time    GFR est  08/17/2020 08:57 AM    GFR est non-AA 95 08/17/2020 08:57 AM    Creatinine 0.79 08/17/2020 08:57 AM    BUN 15 08/17/2020 08:57 AM    Sodium 137 08/17/2020 08:57 AM    Potassium 4.6 08/17/2020 08:57 AM    Chloride 101 08/17/2020 08:57 AM    CO2 23 08/17/2020 08:57 AM      Lab Results   Component Value Date/Time    TSH 0.587 11/06/2018 08:11 AM        Assessment/Plan:     1. Type 2 Diabetes Mellitus  Lab Results   Component Value Date/Time    Hemoglobin A1c 6.9 (H) 08/17/2020 08:57 AM    Hemoglobin A1c (POC) 6.1 06/05/2014 09:35 AM   controlled for now  Blood glucose trending higher, to increase activity  Continue Metformin  on januvia 100 mg-   FLU annually ,Pneumovax ,aspirin daily,annual eye exam,microalbumin    2. HTN : Continue current therapy . 3. Post ablative hypothyroidism-continue levothyroxine,none  on LT4 on Sunday   Gluten sensitivity     4. Obesity:Body mass index is 32.39 kg/m².     5 Hyperlipidemia - diet controlled  Refused statin - understands the benefits      6 Transaminitis - NAFLD  Vit E  Hepatitis panel negative      Plantar fascitis - had surgery 25 years ago     Thank you for allowing me to participate in the care of this patient. Dina Epstein MD    Patient verbalized understanding    Voice-recognition software was used to generate this report, which may result in some phonetic-based errors in the grammar and contents. Even though attempts were made to correct all the mistakes, some may have been missed and remained in the body of the report.

## 2020-08-24 NOTE — PROGRESS NOTES
Андрей Moreland is a 60 y.o. male here for   Chief Complaint   Patient presents with    Diabetes       1. Have you been to the ER, urgent care clinic since your last visit? Hospitalized since your last visit? -no    2. Have you seen or consulted any other health care providers outside of the [address] [place] [address] since your last visit?   Include any pap smears or colon screening.-no

## 2020-08-24 NOTE — LETTER
8/25/20 Patient: Carol Bowers YOB: 1955 Date of Visit: 8/24/2020 Chapincito Gupta MD 
[address] [place] [address] VIA Facsimile: [phone] Dear Chapincito Gupta MD, Thank you for referring Mr. Tyree Renee to [address] [place] [address] for evaluation. My notes for this consultation are attached. If you have questions, please do not hesitate to call me. I look forward to following your patient along with you. Sincerely, Warren Kemp MD

## 2020-12-08 LAB
EST. AVERAGE GLUCOSE BLD GHB EST-MCNC: 146 MG/DL
HBA1C MFR BLD: 6.7 % (ref 4.8–5.6)
LDLC SERPL DIRECT ASSAY-MCNC: 99 MG/DL (ref 0–99)

## 2020-12-14 ENCOUNTER — OFFICE VISIT (OUTPATIENT)
Dept: ENDOCRINOLOGY | Age: 65
End: 2020-12-14
Payer: COMMERCIAL

## 2020-12-14 VITALS
RESPIRATION RATE: 16 BRPM | TEMPERATURE: 97 F | BODY MASS INDEX: 32.13 KG/M2 | OXYGEN SATURATION: 97 % | SYSTOLIC BLOOD PRESSURE: 127 MMHG | WEIGHT: 212 LBS | DIASTOLIC BLOOD PRESSURE: 55 MMHG | HEIGHT: 68 IN | HEART RATE: 78 BPM

## 2020-12-14 DIAGNOSIS — E03.4 HYPOTHYROIDISM DUE TO ACQUIRED ATROPHY OF THYROID: ICD-10-CM

## 2020-12-14 DIAGNOSIS — I10 ESSENTIAL HYPERTENSION: ICD-10-CM

## 2020-12-14 DIAGNOSIS — E11.65 TYPE 2 DIABETES MELLITUS WITH HYPERGLYCEMIA, WITHOUT LONG-TERM CURRENT USE OF INSULIN (HCC): Primary | ICD-10-CM

## 2020-12-14 DIAGNOSIS — E11.65 TYPE 2 DIABETES MELLITUS WITH HYPERGLYCEMIA, WITHOUT LONG-TERM CURRENT USE OF INSULIN (HCC): ICD-10-CM

## 2020-12-14 DIAGNOSIS — E78.2 MIXED HYPERLIPIDEMIA: ICD-10-CM

## 2020-12-14 PROCEDURE — 99214 OFFICE O/P EST MOD 30 MIN: CPT | Performed by: INTERNAL MEDICINE

## 2020-12-14 RX ORDER — METFORMIN HYDROCHLORIDE 1000 MG/1
1000 TABLET ORAL 2 TIMES DAILY WITH MEALS
Qty: 180 TAB | Refills: 3 | Status: SHIPPED | OUTPATIENT
Start: 2020-12-14 | End: 2021-04-19 | Stop reason: SDUPTHER

## 2020-12-14 RX ORDER — LISINOPRIL 20 MG/1
20 TABLET ORAL DAILY
Qty: 90 TAB | Refills: 3 | Status: SHIPPED | OUTPATIENT
Start: 2020-12-14 | End: 2021-04-19 | Stop reason: SDUPTHER

## 2020-12-14 RX ORDER — BLOOD SUGAR DIAGNOSTIC
STRIP MISCELLANEOUS
Qty: 300 STRIP | Refills: 3 | Status: SHIPPED | OUTPATIENT
Start: 2020-12-14 | End: 2021-04-19 | Stop reason: SDUPTHER

## 2020-12-14 RX ORDER — LEVOTHYROXINE SODIUM 200 UG/1
200 TABLET ORAL
Qty: 90 TAB | Refills: 3 | Status: SHIPPED | OUTPATIENT
Start: 2020-12-14 | End: 2021-04-19 | Stop reason: SDUPTHER

## 2020-12-14 RX ORDER — LANCETS
EACH MISCELLANEOUS
Qty: 300 EACH | Refills: 3 | Status: SHIPPED | OUTPATIENT
Start: 2020-12-14 | End: 2021-04-19 | Stop reason: SDUPTHER

## 2020-12-14 NOTE — PROGRESS NOTES
Hernan Carter is a 60 y.o. male here for   Chief Complaint   Patient presents with    Diabetes    Thyroid Problem       1. Have you been to the ER, urgent care clinic since your last visit? Hospitalized since your last visit? -no    2. Have you seen or consulted any other health care providers outside of the [address] [place] [address] since your last visit?   Include any pap smears or colon screening.-no

## 2020-12-14 NOTE — PROGRESS NOTES
Marlo Calhoun MD          Patient Information  Date:2020  Name : Aaron Medina 60 y.o.     YOB: 1955         Referred by: Makeda Raymond MD         Chief Complaint   Patient presents with    Diabetes    Thyroid Problem       History of Present Illness: Aaron Medina is a 60 y.o. male here for  Follow-up of  Type 2 Diabetes Mellitus. DM dx    He was on sulfonylureas which was discontinued due to hypoglycemia. on januvia and metformin    Fasting blood glucose are higher lately, strips have not   LFts was mildly abnormal -on vitamin E    Checking glucose at home  Compliant with the medications    He was referred by Dr. Varun Calloway for the management of type 2 diabetes. He was seen by Dr. Jonathon Hutchison in  for hyperthyroidism, status post radioactive iodine therapy. He is currently hypothyroid on Levothyroxine replacement. Wt Readings from Last 3 Encounters:   20 212 lb (96.2 kg)   20 213 lb (96.6 kg)   19 207 lb 11.2 oz (94.2 kg)       BP Readings from Last 3 Encounters:   20 (!) 127/55   20 138/59   19 111/60           Past Medical History:   Diagnosis Date    Diabetes ([facility] [address]     Hyperlipidemia LDL goal < 100     Hyperthyroidism 20 yrs ago    treated with MANZANARES    Hypothyroidism     s/p MANZANARES     Current Outpatient Medications   Medication Sig    SITagliptin (JANUVIA) 100 mg tablet Take 1 Tab by mouth every morning.  metFORMIN (GLUCOPHAGE) 1,000 mg tablet Take 1 Tab by mouth two (2) times daily (with meals).  lisinopriL (PRINIVIL, ZESTRIL) 20 mg tablet Take 1 Tab by mouth daily.  levothyroxine (SYNTHROID) 200 mcg tablet Take 1 Tab by mouth Daily (before breakfast).  Except     lancets misc Use to test blood sugar 3 times daily Dx Code: E11.65    glucose blood VI test strips (ONETOUCH VERIO) strip Test TID Dx Code: E11.65    vitamin e (E GEMS) 100 unit capsule Take 100 Units by mouth every other day.  omega-3 fatty acids-vitamin e (FISH OIL) 1,000 mg cap Take 1 Cap by mouth daily.  POTASSIUM (POTASSIMIN PO) Take  by mouth.  aspirin (TONY CHILDRENS ASPIRIN) 81 mg chewable tablet Take 81 mg by mouth daily. Patient states three times per week    cholecalciferol, vitamin D3, (VITAMIN D3) 2,000 unit tab Take  by mouth. No current facility-administered medications for this visit. Allergies   Allergen Reactions    Glyburide Other (comments)     hypoglycemic    Crestor [Rosuvastatin] Nausea Only    Quinolones Other (comments)    Statins-Hmg-Coa Reductase Inhibitors Other (comments)    Neuromuscular Blockers, Steroidal Other (comments)     dizziness         Review of Systems:  - Constitutional Symptoms: no fevers, no chills  - Eyes: no blurry vision no double vision  - Cardiovascular: no chest pain ,no palpitations  - Respiratory: no cough no shortness of breath  - Gastrointestinal: no dysphagia no  abdominal pain  - Musculoskeletal: no joint pains no  weakness  - Integumentary: no rashes  -     Physical Examination:   Blood pressure (!) 127/55, pulse 78, temperature 97 °F (36.1 °C), temperature source Oral, resp. rate 16, height 5' 8\" (1.727 m), weight 212 lb (96.2 kg), SpO2 97 %. Estimated body mass index is 32.23 kg/m² as calculated from the following:    Height as of this encounter: 5' 8\" (1.727 m). -   Weight as of this encounter: 212 lb (96.2 kg).   - General: pleasant, no distress, good eye contact  - HEENT: no exophthalmos, no periorbital edema, EOMI  - Neck: No visible thyromegaly  - RS: Normal respiratory effort  - Musculoskeletal: no tremors  - Neurological: alert and oriented  - Psychiatric: normal mood and affect  - Skin: Normal color  -   Diabetic foot exam: July 2019    Left:     Vibratory sensation normal    Filament test normal sensation with micro filament   Pulse DP: 1+    Deformities: None  Right:    Vibratory sensation normal   Filament test normal sensation with micro filament   Pulse DP: 1+   Deformities: None      Data Reviewed:         Lab Results   Component Value Date/Time    Hemoglobin A1c 6.7 (H) 12/07/2020 08:59 AM    Hemoglobin A1c 6.9 (H) 08/17/2020 08:57 AM    Hemoglobin A1c 6.8 (H) 04/13/2020 08:15 AM    Glucose 135 (H) 08/17/2020 08:57 AM    Glucose POC 98 06/05/2014 09:35 AM    Microalb/Creat ratio (ug/mg creat.) 40.1 (H) 07/23/2019 09:14 AM    LDL,Direct 99 12/07/2020 08:59 AM    LDL, calculated 95 07/23/2019 09:14 AM    Creatinine 0.79 08/17/2020 08:57 AM      Lab Results   Component Value Date/Time    Cholesterol, total 163 07/23/2019 09:14 AM    HDL Cholesterol 47 07/23/2019 09:14 AM    LDL,Direct 99 12/07/2020 08:59 AM    LDL, calculated 95 07/23/2019 09:14 AM    Triglyceride 103 07/23/2019 09:14 AM     Lab Results   Component Value Date/Time    ALT (SGPT) 56 (H) 07/23/2019 09:14 AM    Alk. phosphatase 72 07/23/2019 09:14 AM    Bilirubin, total 0.3 07/23/2019 09:14 AM     Lab Results   Component Value Date/Time    GFR est  08/17/2020 08:57 AM    GFR est non-AA 95 08/17/2020 08:57 AM    Creatinine 0.79 08/17/2020 08:57 AM    BUN 15 08/17/2020 08:57 AM    Sodium 137 08/17/2020 08:57 AM    Potassium 4.6 08/17/2020 08:57 AM    Chloride 101 08/17/2020 08:57 AM    CO2 23 08/17/2020 08:57 AM      Lab Results   Component Value Date/Time    TSH 0.587 11/06/2018 08:11 AM        Assessment/Plan:     1. Type 2 Diabetes Mellitus  Lab Results   Component Value Date/Time    Hemoglobin A1c 6.7 (H) 12/07/2020 08:59 AM    Hemoglobin A1c (POC) 6.1 06/05/2014 09:35 AM   controlled for now  Continue Metformin  on januvia 100 mg-   FLU annually ,Pneumovax ,aspirin daily,annual eye exam,microalbumin    2. HTN : Continue current therapy . 3. Post ablative hypothyroidism-continue levothyroxine,none  on LT4 on Sunday   Gluten sensitivity     4. Obesity:Body mass index is 32.23 kg/m².     5 Hyperlipidemia - diet controlled  Refused statin - understands the benefits      6 Transaminitis - NAFLD  Vit E  Hepatitis panel negative      Plantar fascitis - had surgery 25 years ago     Thank you for allowing me to participate in the care of this patient. Dena Olivo MD    Patient verbalized understanding    Voice-recognition software was used to generate this report, which may result in some phonetic-based errors in the grammar and contents. Even though attempts were made to correct all the mistakes, some may have been missed and remained in the body of the report.

## 2020-12-14 NOTE — LETTER
12/17/2020 Patient: Elli Scott YOB: 1955 Date of Visit: 12/14/2020 Emerita Becker MD 
[address] [address] Via Fax: [phone] Dear Emerita Becker MD, Thank you for referring Mr. Alyse Damian to [address] [place] [address] for evaluation. My notes for this consultation are attached. If you have questions, please do not hesitate to call me. I look forward to following your patient along with you. Sincerely, Deon Richard MD

## 2021-04-15 LAB
ALBUMIN SERPL-MCNC: 4.5 G/DL (ref 3.8–4.8)
ALBUMIN/CREAT UR: 68 MG/G CREAT (ref 0–29)
ALBUMIN/GLOB SERPL: 1.7 {RATIO} (ref 1.2–2.2)
ALP SERPL-CCNC: 90 IU/L (ref 39–117)
ALT SERPL-CCNC: 54 IU/L (ref 0–44)
AST SERPL-CCNC: 18 IU/L (ref 0–40)
BILIRUB SERPL-MCNC: <0.2 MG/DL (ref 0–1.2)
BUN SERPL-MCNC: 17 MG/DL (ref 8–27)
BUN/CREAT SERPL: 24 (ref 10–24)
CALCIUM SERPL-MCNC: 9.2 MG/DL (ref 8.6–10.2)
CHLORIDE SERPL-SCNC: 102 MMOL/L (ref 96–106)
CO2 SERPL-SCNC: 23 MMOL/L (ref 20–29)
CREAT SERPL-MCNC: 0.72 MG/DL (ref 0.76–1.27)
CREAT UR-MCNC: 117.8 MG/DL
EST. AVERAGE GLUCOSE BLD GHB EST-MCNC: 157 MG/DL
GLOBULIN SER CALC-MCNC: 2.7 G/DL (ref 1.5–4.5)
GLUCOSE SERPL-MCNC: 153 MG/DL (ref 65–99)
HBA1C MFR BLD: 7.1 % (ref 4.8–5.6)
LDLC SERPL DIRECT ASSAY-MCNC: 104 MG/DL (ref 0–99)
MICROALBUMIN UR-MCNC: 79.9 UG/ML
POTASSIUM SERPL-SCNC: 4.4 MMOL/L (ref 3.5–5.2)
PROT SERPL-MCNC: 7.2 G/DL (ref 6–8.5)
SODIUM SERPL-SCNC: 138 MMOL/L (ref 134–144)

## 2021-04-19 ENCOUNTER — OFFICE VISIT (OUTPATIENT)
Dept: ENDOCRINOLOGY | Age: 66
End: 2021-04-19
Payer: COMMERCIAL

## 2021-04-19 VITALS
TEMPERATURE: 97.8 F | WEIGHT: 214.3 LBS | HEIGHT: 68 IN | SYSTOLIC BLOOD PRESSURE: 119 MMHG | BODY MASS INDEX: 32.48 KG/M2 | DIASTOLIC BLOOD PRESSURE: 52 MMHG | OXYGEN SATURATION: 96 % | HEART RATE: 72 BPM | RESPIRATION RATE: 14 BRPM

## 2021-04-19 DIAGNOSIS — E11.65 TYPE 2 DIABETES MELLITUS WITH HYPERGLYCEMIA, WITHOUT LONG-TERM CURRENT USE OF INSULIN (HCC): ICD-10-CM

## 2021-04-19 DIAGNOSIS — E11.65 TYPE 2 DIABETES MELLITUS WITH HYPERGLYCEMIA, WITHOUT LONG-TERM CURRENT USE OF INSULIN (HCC): Primary | ICD-10-CM

## 2021-04-19 DIAGNOSIS — E78.2 MIXED HYPERLIPIDEMIA: ICD-10-CM

## 2021-04-19 DIAGNOSIS — I10 ESSENTIAL HYPERTENSION: ICD-10-CM

## 2021-04-19 DIAGNOSIS — E03.4 HYPOTHYROIDISM DUE TO ACQUIRED ATROPHY OF THYROID: ICD-10-CM

## 2021-04-19 PROCEDURE — 3051F HG A1C>EQUAL 7.0%<8.0%: CPT | Performed by: INTERNAL MEDICINE

## 2021-04-19 PROCEDURE — 99214 OFFICE O/P EST MOD 30 MIN: CPT | Performed by: INTERNAL MEDICINE

## 2021-04-19 RX ORDER — LANCETS
EACH MISCELLANEOUS
Qty: 300 EACH | Refills: 3 | Status: SHIPPED | OUTPATIENT
Start: 2021-04-19 | End: 2022-01-04 | Stop reason: SDUPTHER

## 2021-04-19 RX ORDER — METFORMIN HYDROCHLORIDE 1000 MG/1
1000 TABLET ORAL 2 TIMES DAILY WITH MEALS
Qty: 180 TAB | Refills: 3 | Status: SHIPPED | OUTPATIENT
Start: 2021-04-19 | End: 2021-08-23 | Stop reason: SDUPTHER

## 2021-04-19 RX ORDER — LISINOPRIL 20 MG/1
20 TABLET ORAL DAILY
Qty: 90 TAB | Refills: 3 | Status: SHIPPED | OUTPATIENT
Start: 2021-04-19 | End: 2021-08-23 | Stop reason: SDUPTHER

## 2021-04-19 RX ORDER — BLOOD SUGAR DIAGNOSTIC
STRIP MISCELLANEOUS
Qty: 300 STRIP | Refills: 3 | Status: SHIPPED | OUTPATIENT
Start: 2021-04-19 | End: 2022-01-04 | Stop reason: SDUPTHER

## 2021-04-19 RX ORDER — LEVOTHYROXINE SODIUM 200 UG/1
200 TABLET ORAL
Qty: 90 TAB | Refills: 3 | Status: SHIPPED | OUTPATIENT
Start: 2021-04-19 | End: 2021-08-23 | Stop reason: SDUPTHER

## 2021-04-19 NOTE — PROGRESS NOTES
Real Ricardo is a 60 y.o. male here for   Chief Complaint   Patient presents with    Thyroid Problem    Diabetes       1. Have you been to the ER, urgent care clinic since your last visit? Hospitalized since your last visit? - no    2. Have you seen or consulted any other health care providers outside of the [address] [place] [address] since your last visit?   Include any pap smears or colon screening.- no

## 2021-04-19 NOTE — PROGRESS NOTES
Evonne Dumont MD          Patient Information  Date:4/19/2021  Name : Sophy Carlos 60 y.o.     YOB: 1955         Referred by: Jovi Weathers MD         Chief Complaint   Patient presents with    Thyroid Problem    Diabetes       History of Present Illness: Sophy Carlos is a 60 y.o. male here for  Follow-up of  Type 2 Diabetes Mellitus. DM dx 2006   He was on sulfonylureas which was discontinued due to hypoglycemia. on januvia and metformin  Wife had Covid, he tested negative  Fasting blood glucose higher than rest of the blood glucose  LFts was mildly abnormal -on vitamin E    Checking glucose at home  Compliant with the medications    He was referred by Dr. Regina Santos for the management of type 2 diabetes. He was seen by Dr. Bud Crawley in 2010 for hyperthyroidism, status post radioactive iodine therapy. He is currently hypothyroid on Levothyroxine replacement. Wt Readings from Last 3 Encounters:   04/19/21 214 lb 4.8 oz (97.2 kg)   12/14/20 212 lb (96.2 kg)   08/24/20 213 lb (96.6 kg)       BP Readings from Last 3 Encounters:   12/14/20 (!) 127/55   08/24/20 138/59   12/05/19 111/60           Past Medical History:   Diagnosis Date    Diabetes ([address]     Hyperlipidemia LDL goal < 100     Hyperthyroidism 20 yrs ago    treated with MANZANARES    Hypothyroidism     s/p MANZANARES     Current Outpatient Medications   Medication Sig    SITagliptin (JANUVIA) 100 mg tablet Take 1 Tab by mouth every morning.  metFORMIN (GLUCOPHAGE) 1,000 mg tablet Take 1 Tab by mouth two (2) times daily (with meals).  lisinopriL (PRINIVIL, ZESTRIL) 20 mg tablet Take 1 Tab by mouth daily.  levothyroxine (SYNTHROID) 200 mcg tablet Take 1 Tab by mouth Daily (before breakfast).  Except Sunday    lancets misc Use to test blood sugar 3 times daily Dx Code: E11.65    glucose blood VI test strips (OneTouch Verio test strips) strip Test TID Dx Code: E11.65    vitamin e (E GEMS) 100 unit capsule Take 100 Units by mouth every other day.  omega-3 fatty acids-vitamin e (FISH OIL) 1,000 mg cap Take 1 Cap by mouth daily.  POTASSIUM (POTASSIMIN PO) Take  by mouth.  aspirin (TONY CHILDRENS ASPIRIN) 81 mg chewable tablet Take 81 mg by mouth daily. Patient states three times per week    cholecalciferol, vitamin D3, (VITAMIN D3) 2,000 unit tab Take  by mouth. No current facility-administered medications for this visit. Allergies   Allergen Reactions    Glyburide Other (comments)     hypoglycemic    Crestor [Rosuvastatin] Nausea Only    Quinolones Other (comments)    Statins-Hmg-Coa Reductase Inhibitors Other (comments)    Neuromuscular Blockers, Steroidal Other (comments)     dizziness         Review of Systems:  - HPI    Physical Examination:   Height 5' 8\" (1.727 m), weight 214 lb 4.8 oz (97.2 kg). Estimated body mass index is 32.58 kg/m² as calculated from the following:    Height as of this encounter: 5' 8\" (1.727 m). -   Weight as of this encounter: 214 lb 4.8 oz (97.2 kg).   - General: pleasant, no distress, good eye contact  - HEENT: no exophthalmos, no periorbital edema, EOMI  - Neck: No visible thyromegaly  - RS: Normal respiratory effort  - Musculoskeletal: no tremors  - Neurological: alert and oriented  - Psychiatric: normal mood and affect  - Skin: Normal color  -   Diabetic foot exam: April 2021    Left:     Vibratory sensation normal    Filament test normal sensation with micro filament   Pulse DP: 1+    Deformities: None  Right:    Vibratory sensation normal   Filament test normal sensation with micro filament   Pulse DP: 1+   Deformities: None      Data Reviewed:         Lab Results   Component Value Date/Time    Hemoglobin A1c 7.1 (H) 04/14/2021 08:51 AM    Hemoglobin A1c 6.7 (H) 12/07/2020 08:59 AM    Hemoglobin A1c 6.9 (H) 08/17/2020 08:57 AM    Glucose 153 (H) 04/14/2021 08:51 AM    Glucose POC 98 06/05/2014 09:35 AM Microalb/Creat ratio (ug/mg creat.) 68 (H) 04/14/2021 08:51 AM    LDL,Direct 104 (H) 04/14/2021 08:51 AM    LDL, calculated 95 07/23/2019 09:14 AM    Creatinine 0.72 (L) 04/14/2021 08:51 AM      Lab Results   Component Value Date/Time    Cholesterol, total 163 07/23/2019 09:14 AM    HDL Cholesterol 47 07/23/2019 09:14 AM    LDL,Direct 104 (H) 04/14/2021 08:51 AM    LDL, calculated 95 07/23/2019 09:14 AM    Triglyceride 103 07/23/2019 09:14 AM     Lab Results   Component Value Date/Time    ALT (SGPT) 54 (H) 04/14/2021 08:51 AM    Alk. phosphatase 90 04/14/2021 08:51 AM    Bilirubin, total <0.2 04/14/2021 08:51 AM     Lab Results   Component Value Date/Time    GFR est  04/14/2021 08:51 AM    GFR est non-AA 99 04/14/2021 08:51 AM    Creatinine 0.72 (L) 04/14/2021 08:51 AM    BUN 17 04/14/2021 08:51 AM    Sodium 138 04/14/2021 08:51 AM    Potassium 4.4 04/14/2021 08:51 AM    Chloride 102 04/14/2021 08:51 AM    CO2 23 04/14/2021 08:51 AM      Lab Results   Component Value Date/Time    TSH 0.587 11/06/2018 08:11 AM        Assessment/Plan:     1. Type 2 Diabetes Mellitus  Lab Results   Component Value Date/Time    Hemoglobin A1c 7.1 (H) 04/14/2021 08:51 AM    Hemoglobin A1c (POC) 6.1 06/05/2014 09:35 AM   Controlled  Continue Metformin  on januvia 100 mg-   FLU annually ,Pneumovax ,aspirin daily,annual eye exam,microalbumin    2. HTN : Continue current therapy . 3. Post ablative hypothyroidism-continue levothyroxine,none  on LT4 on Sunday   Gluten sensitivity     4. Obesity:Body mass index is 32.58 kg/m². 5 Hyperlipidemia - diet controlled  Refused statin - understands the benefits      6 Transaminitis - NAFLD  Vit E  Hepatitis panel negative      Plantar fascitis - had surgery 25 years ago     Thank you for allowing me to participate in the care of this patient.     Yordan Chapman MD    Patient verbalized understanding    Voice-recognition software was used to generate this report, which may result in some phonetic-based errors in the grammar and contents. Even though attempts were made to correct all the mistakes, some may have been missed and remained in the body of the report.

## 2021-04-19 NOTE — LETTER
4/19/2021 Patient: Hamida Rodriguez YOB: 1955 Date of Visit: 4/19/2021 Deni Franklin MD 
[address] [place] [id number] Via Fax: [phone] Dear Deni Franklin MD, Thank you for referring Mr. Lakisha Walton to [address] [place] [address] for evaluation. My notes for this consultation are attached. If you have questions, please do not hesitate to call me. I look forward to following your patient along with you. Sincerely, Asim Guallpa MD

## 2021-08-14 LAB
BUN SERPL-MCNC: 21 MG/DL (ref 8–27)
BUN/CREAT SERPL: 26 (ref 10–24)
CALCIUM SERPL-MCNC: 9.2 MG/DL (ref 8.6–10.2)
CHLORIDE SERPL-SCNC: 103 MMOL/L (ref 96–106)
CO2 SERPL-SCNC: 22 MMOL/L (ref 20–29)
CREAT SERPL-MCNC: 0.82 MG/DL (ref 0.76–1.27)
EST. AVERAGE GLUCOSE BLD GHB EST-MCNC: 160 MG/DL
GLUCOSE SERPL-MCNC: 148 MG/DL (ref 65–99)
HBA1C MFR BLD: 7.2 % (ref 4.8–5.6)
POTASSIUM SERPL-SCNC: 4.5 MMOL/L (ref 3.5–5.2)
SODIUM SERPL-SCNC: 139 MMOL/L (ref 134–144)

## 2021-08-23 ENCOUNTER — OFFICE VISIT (OUTPATIENT)
Dept: ENDOCRINOLOGY | Age: 66
End: 2021-08-23
Payer: COMMERCIAL

## 2021-08-23 VITALS
DIASTOLIC BLOOD PRESSURE: 65 MMHG | HEIGHT: 68 IN | OXYGEN SATURATION: 97 % | SYSTOLIC BLOOD PRESSURE: 118 MMHG | BODY MASS INDEX: 32.43 KG/M2 | HEART RATE: 78 BPM | TEMPERATURE: 98.4 F | WEIGHT: 214 LBS | RESPIRATION RATE: 16 BRPM

## 2021-08-23 DIAGNOSIS — E11.65 TYPE 2 DIABETES MELLITUS WITH HYPERGLYCEMIA, WITHOUT LONG-TERM CURRENT USE OF INSULIN (HCC): Primary | ICD-10-CM

## 2021-08-23 DIAGNOSIS — E03.4 HYPOTHYROIDISM DUE TO ACQUIRED ATROPHY OF THYROID: ICD-10-CM

## 2021-08-23 DIAGNOSIS — E78.2 MIXED HYPERLIPIDEMIA: ICD-10-CM

## 2021-08-23 DIAGNOSIS — I10 ESSENTIAL HYPERTENSION: ICD-10-CM

## 2021-08-23 PROCEDURE — 99214 OFFICE O/P EST MOD 30 MIN: CPT | Performed by: INTERNAL MEDICINE

## 2021-08-23 PROCEDURE — 3051F HG A1C>EQUAL 7.0%<8.0%: CPT | Performed by: INTERNAL MEDICINE

## 2021-08-23 RX ORDER — LISINOPRIL 20 MG/1
20 TABLET ORAL DAILY
Qty: 90 TABLET | Refills: 3 | Status: SHIPPED | OUTPATIENT
Start: 2021-08-23 | End: 2022-01-04 | Stop reason: SDUPTHER

## 2021-08-23 RX ORDER — LEVOTHYROXINE SODIUM 200 UG/1
200 TABLET ORAL
Qty: 90 TABLET | Refills: 3 | Status: SHIPPED | OUTPATIENT
Start: 2021-08-23 | End: 2022-01-04 | Stop reason: SDUPTHER

## 2021-08-23 RX ORDER — METFORMIN HYDROCHLORIDE 1000 MG/1
1000 TABLET ORAL 2 TIMES DAILY WITH MEALS
Qty: 180 TABLET | Refills: 3 | Status: SHIPPED | OUTPATIENT
Start: 2021-08-23 | End: 2022-01-04 | Stop reason: SDUPTHER

## 2021-08-23 NOTE — PROGRESS NOTES
Kathleen Rodriguez MD          Patient Information  Date:8/23/2021  Name : Eran Mcnair 73 y.o.     YOB: 1955         Referred by: Laurie Davies MD         No chief complaint on file. History of Present Illness: Eran Mcnair is a 73 y.o. male here for  Follow-up of  Type 2 Diabetes Mellitus. DM dx 2006   He was on sulfonylureas which was discontinued due to hypoglycemia. on januvia and metformin  Wife had Covid, he tested negative  Fasting blood glucose higher than rest of the blood glucose  LFts was mildly abnormal -on vitamin E    Checking glucose at home  Compliant with the medications  helping wife due to medical issues, not able to walk much, blood glucose have been high     He was referred by Dr. Samira Mosley for the management of type 2 diabetes. He was seen by Dr. Moris Littlejohn in 2010 for hyperthyroidism, status post radioactive iodine therapy. He is currently hypothyroid on Levothyroxine replacement. Wt Readings from Last 3 Encounters:   04/19/21 214 lb 4.8 oz (97.2 kg)   12/14/20 212 lb (96.2 kg)   08/24/20 213 lb (96.6 kg)       BP Readings from Last 3 Encounters:   04/19/21 (!) 119/52   12/14/20 (!) 127/55   08/24/20 138/59           Past Medical History:   Diagnosis Date    Diabetes ([facility] [address]     Hyperlipidemia LDL goal < 100     Hyperthyroidism 20 yrs ago    treated with MANZANARES    Hypothyroidism     s/p MANZANARES     Current Outpatient Medications   Medication Sig    SITagliptin (JANUVIA) 100 mg tablet Take 1 Tab by mouth every morning.  metFORMIN (GLUCOPHAGE) 1,000 mg tablet Take 1 Tab by mouth two (2) times daily (with meals).  lisinopriL (PRINIVIL, ZESTRIL) 20 mg tablet Take 1 Tab by mouth daily.  levothyroxine (SYNTHROID) 200 mcg tablet Take 1 Tab by mouth Daily (before breakfast).  Except Sunday    lancets misc Use to test blood sugar 3 times daily Dx Code: E11.65    glucose blood VI test strips (OneTouch Verio test strips) strip Test TID Dx Code: E11.65    vitamin e (E GEMS) 100 unit capsule Take 100 Units by mouth every other day.  omega-3 fatty acids-vitamin e (FISH OIL) 1,000 mg cap Take 1 Cap by mouth daily.  POTASSIUM (POTASSIMIN PO) Take  by mouth.  cholecalciferol, vitamin D3, (VITAMIN D3) 2,000 unit tab Take  by mouth.  aspirin (TONY CHILDRENS ASPIRIN) 81 mg chewable tablet Take 81 mg by mouth daily. Patient states three times per week     No current facility-administered medications for this visit. Allergies   Allergen Reactions    Glyburide Other (comments)     hypoglycemic    Crestor [Rosuvastatin] Nausea Only    Quinolones Other (comments)    Statins-Hmg-Coa Reductase Inhibitors Other (comments)    Neuromuscular Blockers, Steroidal Other (comments)     dizziness         Review of Systems:  - HPI    Physical Examination:   There were no vitals taken for this visit. Estimated body mass index is 32.58 kg/m² as calculated from the following:    Height as of 4/19/21: 5' 8\" (1.727 m). -   Weight as of 4/19/21: 214 lb 4.8 oz (97.2 kg).   - General: pleasant, no distress, good eye contact  - HEENT: no exophthalmos, no periorbital edema, EOMI  - Neck: No visible thyromegaly  - RS: Normal respiratory effort  - Musculoskeletal: no tremors  - Neurological: alert and oriented  - Psychiatric: normal mood and affect  - Skin: Normal color  -   Diabetic foot exam: April 2021    Left:     Vibratory sensation normal    Filament test normal sensation with micro filament   Pulse DP: 1+    Deformities: None  Right:    Vibratory sensation normal   Filament test normal sensation with micro filament   Pulse DP: 1+   Deformities: None      Data Reviewed:         Lab Results   Component Value Date/Time    Hemoglobin A1c 7.2 (H) 08/13/2021 09:17 AM    Hemoglobin A1c 7.1 (H) 04/14/2021 08:51 AM    Hemoglobin A1c 6.7 (H) 12/07/2020 08:59 AM    Glucose 148 (H) 08/13/2021 09:17 AM    Glucose POC 98 06/05/2014 09:35 AM    Microalb/Creat ratio (ug/mg creat.) 68 (H) 04/14/2021 08:51 AM    LDL,Direct 104 (H) 04/14/2021 08:51 AM    LDL, calculated 95 07/23/2019 09:14 AM    Creatinine 0.82 08/13/2021 09:17 AM      Lab Results   Component Value Date/Time    Cholesterol, total 163 07/23/2019 09:14 AM    HDL Cholesterol 47 07/23/2019 09:14 AM    LDL,Direct 104 (H) 04/14/2021 08:51 AM    LDL, calculated 95 07/23/2019 09:14 AM    Triglyceride 103 07/23/2019 09:14 AM     Lab Results   Component Value Date/Time    ALT (SGPT) 54 (H) 04/14/2021 08:51 AM    Alk. phosphatase 90 04/14/2021 08:51 AM    Bilirubin, total <0.2 04/14/2021 08:51 AM     Lab Results   Component Value Date/Time    GFR est  08/13/2021 09:17 AM    GFR est non-AA 93 08/13/2021 09:17 AM    Creatinine 0.82 08/13/2021 09:17 AM    BUN 21 08/13/2021 09:17 AM    Sodium 139 08/13/2021 09:17 AM    Potassium 4.5 08/13/2021 09:17 AM    Chloride 103 08/13/2021 09:17 AM    CO2 22 08/13/2021 09:17 AM      Lab Results   Component Value Date/Time    TSH 0.587 11/06/2018 08:11 AM        Assessment/Plan:     1. Type 2 Diabetes Mellitus  Lab Results   Component Value Date/Time    Hemoglobin A1c 7.2 (H) 08/13/2021 09:17 AM    Hemoglobin A1c (POC) 6.1 06/05/2014 09:35 AM   Controlled, CONSTANCE phenomenon  Continue Metformin  on januvia 100 mg-   FLU annually ,Pneumovax ,aspirin daily,annual eye exam,microalbumin    2. HTN : Continue current therapy . 3. Post ablative hypothyroidism-continue levothyroxine,none  on LT4 on Sunday   Gluten sensitivity     4. Obesity:There is no height or weight on file to calculate BMI.    5 Hyperlipidemia - diet controlled  Refused statin - understands the benefits      6 Transaminitis - NAFLD  Vit E  Hepatitis panel negative      Plantar fascitis - had surgery 25 years ago     Thank you for allowing me to participate in the care of this patient.     Elizabeth rOtiz MD    Patient verbalized understanding    Voice-recognition software was used to generate this report, which may result in some phonetic-based errors in the grammar and contents. Even though attempts were made to correct all the mistakes, some may have been missed and remained in the body of the report.

## 2022-01-04 ENCOUNTER — VIRTUAL VISIT (OUTPATIENT)
Dept: ENDOCRINOLOGY | Age: 67
End: 2022-01-04
Payer: COMMERCIAL

## 2022-01-04 ENCOUNTER — TELEPHONE (OUTPATIENT)
Dept: ENDOCRINOLOGY | Age: 67
End: 2022-01-04

## 2022-01-04 DIAGNOSIS — E11.65 TYPE 2 DIABETES MELLITUS WITH HYPERGLYCEMIA, WITHOUT LONG-TERM CURRENT USE OF INSULIN (HCC): Primary | ICD-10-CM

## 2022-01-04 DIAGNOSIS — I10 ESSENTIAL HYPERTENSION: ICD-10-CM

## 2022-01-04 DIAGNOSIS — I10 PRIMARY HYPERTENSION: ICD-10-CM

## 2022-01-04 DIAGNOSIS — E78.2 MIXED HYPERLIPIDEMIA: ICD-10-CM

## 2022-01-04 LAB
ALBUMIN/CREAT UR: 71 MG/G CREAT (ref 0–29)
BUN SERPL-MCNC: 29 MG/DL (ref 8–27)
BUN/CREAT SERPL: 33 (ref 10–24)
CALCIUM SERPL-MCNC: 9.8 MG/DL (ref 8.6–10.2)
CHLORIDE SERPL-SCNC: 101 MMOL/L (ref 96–106)
CHOLEST SERPL-MCNC: 174 MG/DL (ref 100–199)
CO2 SERPL-SCNC: 21 MMOL/L (ref 20–29)
CREAT SERPL-MCNC: 0.87 MG/DL (ref 0.76–1.27)
CREAT UR-MCNC: 72.2 MG/DL
EST. AVERAGE GLUCOSE BLD GHB EST-MCNC: 166 MG/DL
GLUCOSE SERPL-MCNC: 140 MG/DL (ref 65–99)
HBA1C MFR BLD: 7.4 % (ref 4.8–5.6)
HDLC SERPL-MCNC: 43 MG/DL
IMP & REVIEW OF LAB RESULTS: NORMAL
LDLC SERPL CALC-MCNC: 97 MG/DL (ref 0–99)
MICROALBUMIN UR-MCNC: 51.2 UG/ML
POTASSIUM SERPL-SCNC: 4.7 MMOL/L (ref 3.5–5.2)
SODIUM SERPL-SCNC: 140 MMOL/L (ref 134–144)
TRIGL SERPL-MCNC: 199 MG/DL (ref 0–149)
VLDLC SERPL CALC-MCNC: 34 MG/DL (ref 5–40)

## 2022-01-04 PROCEDURE — 99443 PR PHYS/QHP TELEPHONE EVALUATION 21-30 MIN: CPT | Performed by: INTERNAL MEDICINE

## 2022-01-04 RX ORDER — LEVOTHYROXINE SODIUM 200 UG/1
200 TABLET ORAL
Qty: 90 TABLET | Refills: 3 | Status: SHIPPED | OUTPATIENT
Start: 2022-01-04 | End: 2022-09-07 | Stop reason: SDUPTHER

## 2022-01-04 RX ORDER — LISINOPRIL 20 MG/1
20 TABLET ORAL DAILY
Qty: 90 TABLET | Refills: 3 | Status: SHIPPED | OUTPATIENT
Start: 2022-01-04 | End: 2022-09-07 | Stop reason: SDUPTHER

## 2022-01-04 RX ORDER — LANCETS
EACH MISCELLANEOUS
Qty: 300 EACH | Refills: 3 | Status: SHIPPED | OUTPATIENT
Start: 2022-01-04 | End: 2022-09-07 | Stop reason: SDUPTHER

## 2022-01-04 RX ORDER — BLOOD SUGAR DIAGNOSTIC
STRIP MISCELLANEOUS
Qty: 300 STRIP | Refills: 3 | Status: SHIPPED | OUTPATIENT
Start: 2022-01-04 | End: 2022-09-07 | Stop reason: SDUPTHER

## 2022-01-04 RX ORDER — METFORMIN HYDROCHLORIDE 1000 MG/1
1000 TABLET ORAL 2 TIMES DAILY WITH MEALS
Qty: 180 TABLET | Refills: 3 | Status: SHIPPED | OUTPATIENT
Start: 2022-01-04 | End: 2022-09-07 | Stop reason: SDUPTHER

## 2022-01-04 NOTE — PROGRESS NOTES
Andrzej Chung MD          Patient Information  Date:1/4/2022  Name : Nevaeh Carvalho 73 y.o.     YOB: 1955         Referred by: Disha Calvert MD   Pursuant to the emergency declaration under the [facility] waiver authority and the [facility] and [employer] Act, this Virtual  Visit was conducted, with patient's consent, to reduce the patient's risk of exposure to COVID-19 . Patient  is aware that this is a billable encounter and is responsible for copays/deductibles       Services were provided through a audio discussion virtually to substitute for in-person clinic visit. Despite trying multiple times the camera was blocked on the patient's end,     Place of service: Provider : Home  Patient: Home      Chief Complaint   Patient presents with    Blood sugar problem       History of Present Illness: Nevaeh Carvalho is a 73 y.o. male here for  Follow-up of  Type 2 Diabetes Mellitus. DM dx 2006   He was on sulfonylureas which was discontinued due to hypoglycemia. on januvia and metformin  210 lbs   Fasting blood glucose higher than rest of the blood glucose  Fasting 141,157,153,161  Prelunch 129,159,154,106,167    LFts was mildly abnormal -on vitamin E. Checking glucose at home 2- 3 times a day   Compliant with the medications  helping wife due to medical issues, not able to walk much, blood glucose have been high     He was referred by Dr. Adeola Waller for the management of type 2 diabetes. He was seen by Dr. Lora Gomez in 2010 for hyperthyroidism, status post radioactive iodine therapy. He is currently hypothyroid on Levothyroxine replacement.       Wt Readings from Last 3 Encounters:   08/23/21 214 lb (97.1 kg)   04/19/21 214 lb 4.8 oz (97.2 kg)   12/14/20 212 lb (96.2 kg)       BP Readings from Last 3 Encounters:   08/23/21 118/65   04/19/21 (!) 119/52 12/14/20 (!) 127/55           Past Medical History:   Diagnosis Date    Diabetes ([facility] [address]     Hyperlipidemia LDL goal < 100     Hyperthyroidism 20 yrs ago    treated with MANZANARES    Hypothyroidism     s/p MANZANARES     Current Outpatient Medications   Medication Sig    SITagliptin (JANUVIA) 100 mg tablet Take 1 Tablet by mouth every morning.  metFORMIN (GLUCOPHAGE) 1,000 mg tablet Take 1 Tablet by mouth two (2) times daily (with meals).  lisinopriL (PRINIVIL, ZESTRIL) 20 mg tablet Take 1 Tablet by mouth daily.  levothyroxine (SYNTHROID) 200 mcg tablet Take 1 Tablet by mouth Daily (before breakfast). Except Sunday    lancets misc Use to test blood sugar 3 times daily Dx Code: E11.65    glucose blood VI test strips (OneTouch Verio test strips) strip Test TID Dx Code: E11.65    omega-3 fatty acids-vitamin e (FISH OIL) 1,000 mg cap Take 1 Cap by mouth daily.  POTASSIUM (POTASSIMIN PO) Take  by mouth.  aspirin (TONY CHILDRENS ASPIRIN) 81 mg chewable tablet Take 81 mg by mouth daily. Patient states three times per week    dapagliflozin (Farxiga) 10 mg tab tablet Take 1 Tablet by mouth daily. No current facility-administered medications for this visit. Allergies   Allergen Reactions    Glyburide Other (comments)     hypoglycemic    Crestor [Rosuvastatin] Nausea Only    Quinolones Other (comments)    Statins-Hmg-Coa Reductase Inhibitors Other (comments)    Neuromuscular Blockers, Steroidal Other (comments)     dizziness         Review of Systems:  - HPI    Physical Examination:   There were no vitals taken for this visit. Estimated body mass index is 32.54 kg/m² as calculated from the following:    Height as of 8/23/21: 5' 8\" (1.727 m). -   Weight as of 8/23/21: 214 lb (97.1 kg).   - General: pleasant, no distress, good eye contact  - HEENT: no exophthalmos, no periorbital edema, EOMI  - Neck: No visible thyromegaly  - RS: Normal respiratory effort  - Musculoskeletal: no tremors  - Neurological: alert and oriented  - Psychiatric: normal mood and affect  - Skin: Normal color  -   Diabetic foot exam: April 2021    Left:     Vibratory sensation normal    Filament test normal sensation with micro filament   Pulse DP: 1+    Deformities: None  Right:    Vibratory sensation normal   Filament test normal sensation with micro filament   Pulse DP: 1+   Deformities: None      Data Reviewed:         Lab Results   Component Value Date/Time    Hemoglobin A1c 7.4 (H) 12/29/2021 08:12 AM    Hemoglobin A1c 7.2 (H) 08/13/2021 09:17 AM    Hemoglobin A1c 7.1 (H) 04/14/2021 08:51 AM    Glucose 140 (H) 12/29/2021 08:12 AM    Glucose POC 98 06/05/2014 09:35 AM    Microalb/Creat ratio (ug/mg creat.) 71 (H) 12/29/2021 08:12 AM    LDL,Direct 104 (H) 04/14/2021 08:51 AM    LDL, calculated 97 12/29/2021 08:12 AM    LDL, calculated 95 07/23/2019 09:14 AM    Creatinine 0.87 12/29/2021 08:12 AM      Lab Results   Component Value Date/Time    Cholesterol, total 174 12/29/2021 08:12 AM    HDL Cholesterol 43 12/29/2021 08:12 AM    LDL,Direct 104 (H) 04/14/2021 08:51 AM    LDL, calculated 97 12/29/2021 08:12 AM    LDL, calculated 95 07/23/2019 09:14 AM    Triglyceride 199 (H) 12/29/2021 08:12 AM     Lab Results   Component Value Date/Time    ALT (SGPT) 54 (H) 04/14/2021 08:51 AM    Alk. phosphatase 90 04/14/2021 08:51 AM    Bilirubin, total <0.2 04/14/2021 08:51 AM     Lab Results   Component Value Date/Time    GFR est  12/29/2021 08:12 AM    GFR est non-AA 91 12/29/2021 08:12 AM    Creatinine 0.87 12/29/2021 08:12 AM    BUN 29 (H) 12/29/2021 08:12 AM    Sodium 140 12/29/2021 08:12 AM    Potassium 4.7 12/29/2021 08:12 AM    Chloride 101 12/29/2021 08:12 AM    CO2 21 12/29/2021 08:12 AM      Lab Results   Component Value Date/Time    TSH 0.587 11/06/2018 08:11 AM        Assessment/Plan:     1.  Type 2 Diabetes Mellitus  Lab Results   Component Value Date/Time    Hemoglobin A1c 7.4 (H) 12/29/2021 08:12 AM    Hemoglobin A1c (POC) 6.1 06/05/2014 09:35 AM   Controlled, CONSTANCE phenomenon  Continue Metformin  on januvia 100 mg-   Eye exam 2020 -no retinopathy  FLU annually ,Pneumovax ,aspirin daily,annual eye exam,microalbumin    2. HTN : Continue current therapy . 3. Post ablative hypothyroidism-continue levothyroxine,none  on LT4 on Sunday   Gluten sensitivity     4. Obesity:There is no height or weight on file to calculate BMI.    5 Hyperlipidemia - diet controlled  Refused statin - understands the benefits      6 Transaminitis - NAFLD  Vit E  Hepatitis panel negative      Plantar fascitis - had surgery 25 years ago     Thank you for allowing me to participate in the care of this patient. Tracie Nieto MD    Patient verbalized understanding    Voice-recognition software was used to generate this report, which may result in some phonetic-based errors in the grammar and contents. Even though attempts were made to correct all the mistakes, some may have been missed and remained in the body of the report.

## 2022-01-04 NOTE — TELEPHONE ENCOUNTER
Please call patient per Dr. Katherine Madrigal request to discuss medication covered by insurance.  He had virtual visit today

## 2022-01-04 NOTE — TELEPHONE ENCOUNTER
Pt stated to send in [address] to [facility] in [address] for 30 day supply. Does not know the dose. Pt also wanted to go ahead and schedule his f/u. Transferred pt to the .

## 2022-03-18 PROBLEM — E11.21 TYPE 2 DIABETES WITH NEPHROPATHY (HCC): Status: ACTIVE | Noted: 2018-05-09

## 2022-03-19 PROBLEM — E78.2 MIXED HYPERLIPIDEMIA: Status: ACTIVE | Noted: 2017-05-15

## 2022-04-28 LAB
ALBUMIN SERPL-MCNC: 4.3 G/DL (ref 3.8–4.8)
ALBUMIN/CREAT UR: 55 MG/G CREAT (ref 0–29)
ALBUMIN/GLOB SERPL: 1.5 {RATIO} (ref 1.2–2.2)
ALP SERPL-CCNC: 88 IU/L (ref 44–121)
ALT SERPL-CCNC: 58 IU/L (ref 0–44)
AST SERPL-CCNC: 27 IU/L (ref 0–40)
BILIRUB SERPL-MCNC: 0.3 MG/DL (ref 0–1.2)
BUN SERPL-MCNC: 21 MG/DL (ref 8–27)
BUN/CREAT SERPL: 26 (ref 10–24)
CALCIUM SERPL-MCNC: 9.3 MG/DL (ref 8.6–10.2)
CHLORIDE SERPL-SCNC: 101 MMOL/L (ref 96–106)
CO2 SERPL-SCNC: 24 MMOL/L (ref 20–29)
CREAT SERPL-MCNC: 0.8 MG/DL (ref 0.76–1.27)
CREAT UR-MCNC: 56 MG/DL
EGFR: 98 ML/MIN/1.73
EST. AVERAGE GLUCOSE BLD GHB EST-MCNC: 157 MG/DL
GLOBULIN SER CALC-MCNC: 2.9 G/DL (ref 1.5–4.5)
GLUCOSE SERPL-MCNC: 138 MG/DL (ref 65–99)
HBA1C MFR BLD: 7.1 % (ref 4.8–5.6)
LDLC SERPL DIRECT ASSAY-MCNC: 95 MG/DL (ref 0–99)
MICROALBUMIN UR-MCNC: 30.7 UG/ML
POTASSIUM SERPL-SCNC: 4.5 MMOL/L (ref 3.5–5.2)
PROT SERPL-MCNC: 7.2 G/DL (ref 6–8.5)
SODIUM SERPL-SCNC: 141 MMOL/L (ref 134–144)

## 2022-05-04 ENCOUNTER — OFFICE VISIT (OUTPATIENT)
Dept: ENDOCRINOLOGY | Age: 67
End: 2022-05-04
Payer: COMMERCIAL

## 2022-05-04 VITALS
TEMPERATURE: 98 F | SYSTOLIC BLOOD PRESSURE: 125 MMHG | HEIGHT: 68 IN | BODY MASS INDEX: 31.98 KG/M2 | RESPIRATION RATE: 20 BRPM | WEIGHT: 211 LBS | DIASTOLIC BLOOD PRESSURE: 58 MMHG | OXYGEN SATURATION: 96 % | HEART RATE: 71 BPM

## 2022-05-04 DIAGNOSIS — I10 PRIMARY HYPERTENSION: ICD-10-CM

## 2022-05-04 DIAGNOSIS — E03.4 HYPOTHYROIDISM DUE TO ACQUIRED ATROPHY OF THYROID: ICD-10-CM

## 2022-05-04 DIAGNOSIS — E11.65 TYPE 2 DIABETES MELLITUS WITH HYPERGLYCEMIA, WITHOUT LONG-TERM CURRENT USE OF INSULIN (HCC): Primary | ICD-10-CM

## 2022-05-04 DIAGNOSIS — E78.2 MIXED HYPERLIPIDEMIA: ICD-10-CM

## 2022-05-04 PROCEDURE — 99214 OFFICE O/P EST MOD 30 MIN: CPT | Performed by: INTERNAL MEDICINE

## 2022-05-04 PROCEDURE — 3051F HG A1C>EQUAL 7.0%<8.0%: CPT | Performed by: INTERNAL MEDICINE

## 2022-05-04 NOTE — LETTER
5/4/2022    Patient: Mary Ellen Smith   YOB: 1955   Date of Visit: 5/4/2022     Emily Heredia MD  [address] [place], [place] [id number]  Via Fax: [phone]    Dear Emily Heredia MD,      Thank you for referring Mr. Angela sEtrada to [address] [place] [address] for evaluation. My notes for this consultation are attached. If you have questions, please do not hesitate to call me. I look forward to following your patient along with you.       Sincerely,    Sharon Conner MD

## 2022-05-04 NOTE — PROGRESS NOTES
Doretha Alberto is a 78 y.o. male here for   Chief Complaint   Patient presents with    Diabetes    Thyroid Problem       1. Have you been to the ER, urgent care clinic since your last visit? Hospitalized since your last visit? -no    2. Have you seen or consulted any other health care providers outside of the [address] [place] [address] since your last visit?   Include any pap smears or colon screening.-no

## 2022-05-04 NOTE — PROGRESS NOTES
Hannah Gandara MD          Patient Information  Date:5/4/2022  Name : Manas Lema 78 y.o.     YOB: 1955         Referred by: Tamia Doyle MD         Chief Complaint   Patient presents with    Diabetes    Thyroid Problem       History of Present Illness: Manas Lema is a 78 y.o. male here for  Follow-up of  Type 2 Diabetes Mellitus. DM dx 2006   He was on sulfonylureas which was discontinued due to hypoglycemia. On Farxiga  and metformin  Frequency of urination     LFts was mildly abnormal -on vitamin E. Checking glucose at home 2- 3 times a day   Compliant with the medications      He was referred by Dr. Tiffany Gamboa for the management of type 2 diabetes. He was seen by Dr. Macey Moss in 2010 for hyperthyroidism, status post radioactive iodine therapy. He is currently hypothyroid on Levothyroxine replacement. Wt Readings from Last 3 Encounters:   08/23/21 214 lb (97.1 kg)   04/19/21 214 lb 4.8 oz (97.2 kg)   12/14/20 212 lb (96.2 kg)       BP Readings from Last 3 Encounters:   08/23/21 118/65   04/19/21 (!) 119/52   12/14/20 (!) 127/55           Past Medical History:   Diagnosis Date    Diabetes ([facility] [address]     Hyperlipidemia LDL goal < 100     Hyperthyroidism 20 yrs ago    treated with MANZANARES    Hypothyroidism     s/p MANZANARES     Current Outpatient Medications   Medication Sig    metFORMIN (GLUCOPHAGE) 1,000 mg tablet Take 1 Tablet by mouth two (2) times daily (with meals).  lisinopriL (PRINIVIL, ZESTRIL) 20 mg tablet Take 1 Tablet by mouth daily.  levothyroxine (SYNTHROID) 200 mcg tablet Take 1 Tablet by mouth Daily (before breakfast). Except Sunday    lancets misc Use to test blood sugar 3 times daily Dx Code: E11.65    glucose blood VI test strips (OneTouch Verio test strips) strip Test TID Dx Code: E11.65    dapagliflozin (Farxiga) 10 mg tab tablet Take 1 Tablet by mouth daily.     omega-3 fatty acids-vitamin e (FISH OIL) 1,000 mg cap Take 1 Cap by mouth daily.  POTASSIUM (POTASSIMIN PO) Take  by mouth.  aspirin (TONY CHILDRENS ASPIRIN) 81 mg chewable tablet Take 81 mg by mouth. Patient states three times per week    SITagliptin (JANUVIA) 100 mg tablet Take 1 Tablet by mouth every morning. (Patient not taking: Reported on 5/4/2022)     No current facility-administered medications for this visit. Allergies   Allergen Reactions    Glyburide Other (comments)     hypoglycemic    Crestor [Rosuvastatin] Nausea Only    Quinolones Other (comments)    Statins-Hmg-Coa Reductase Inhibitors Other (comments)    Neuromuscular Blockers, Steroidal Other (comments)     dizziness         Review of Systems:  - HPI    Physical Examination:   Height 5' 8\" (1.727 m). Estimated body mass index is 32.54 kg/m² as calculated from the following:    Height as of this encounter: 5' 8\" (1.727 m). -   Weight as of 8/23/21: 214 lb (97.1 kg).   - General: pleasant, no distress, good eye contact  - HEENT: no exophthalmos, no periorbital edema, EOMI  - Neck: No visible thyromegaly  - RS: Normal respiratory effort  - Musculoskeletal: no tremors  - Neurological: alert and oriented  - Psychiatric: normal mood and affect  - Skin: Normal color  -   Diabetic foot exam: April 2021    Left:     Vibratory sensation normal    Filament test normal sensation with micro filament   Pulse DP: 1+    Deformities: None  Right:    Vibratory sensation normal   Filament test normal sensation with micro filament   Pulse DP: 1+   Deformities: None      Data Reviewed:         Lab Results   Component Value Date/Time    Hemoglobin A1c 7.1 (H) 04/27/2022 08:56 AM    Hemoglobin A1c 7.4 (H) 12/29/2021 08:12 AM    Hemoglobin A1c 7.2 (H) 08/13/2021 09:17 AM    Glucose 138 (H) 04/27/2022 08:56 AM    Glucose POC 98 06/05/2014 09:35 AM    Microalb/Creat ratio (ug/mg creat.) 55 (H) 04/27/2022 08:56 AM    LDL,Direct 95 04/27/2022 08:56 AM    LDL, calculated 97 12/29/2021 08:12 AM    LDL, calculated 95 07/23/2019 09:14 AM    Creatinine 0.80 04/27/2022 08:56 AM      Lab Results   Component Value Date/Time    Cholesterol, total 174 12/29/2021 08:12 AM    HDL Cholesterol 43 12/29/2021 08:12 AM    LDL,Direct 95 04/27/2022 08:56 AM    LDL, calculated 97 12/29/2021 08:12 AM    LDL, calculated 95 07/23/2019 09:14 AM    Triglyceride 199 (H) 12/29/2021 08:12 AM     Lab Results   Component Value Date/Time    ALT (SGPT) 58 (H) 04/27/2022 08:56 AM    Alk. phosphatase 88 04/27/2022 08:56 AM    Bilirubin, total 0.3 04/27/2022 08:56 AM     Lab Results   Component Value Date/Time    GFR est  12/29/2021 08:12 AM    GFR est non-AA 91 12/29/2021 08:12 AM    Creatinine 0.80 04/27/2022 08:56 AM    BUN 21 04/27/2022 08:56 AM    Sodium 141 04/27/2022 08:56 AM    Potassium 4.5 04/27/2022 08:56 AM    Chloride 101 04/27/2022 08:56 AM    CO2 24 04/27/2022 08:56 AM      Lab Results   Component Value Date/Time    TSH 0.587 11/06/2018 08:11 AM        Assessment/Plan:     1. Type 2 Diabetes Mellitus  Lab Results   Component Value Date/Time    Hemoglobin A1c 7.1 (H) 04/27/2022 08:56 AM    Hemoglobin A1c (POC) 6.1 06/05/2014 09:35 AM   Controlled, CONSTANCE phenomenon  Continue Metformin  Lori Flores, was on Januvia in the past  Eye exam 2020 -no retinopathy  FLU annually ,Pneumovax ,aspirin daily,annual eye exam,microalbumin    2. HTN : Continue current therapy . 3. Post ablative hypothyroidism-continue levothyroxine,none  on LT4 on Sunday   Gluten sensitivity     4. Obesity:Body mass index is 32.54 kg/m². 5 Hyperlipidemia - diet controlled  Refused statin - understands the benefits      6 Transaminitis - NAFLD  Vit E  Hepatitis panel negative      Plantar fascitis - had surgery 25 years ago     Thank you for allowing me to participate in the care of this patient.     Aubrie Gtz MD    Patient verbalized understanding    Voice-recognition software was used to generate this report, which may result in some phonetic-based errors in the grammar and contents. Even though attempts were made to correct all the mistakes, some may have been missed and remained in the body of the report.

## 2022-09-01 LAB
EST. AVERAGE GLUCOSE BLD GHB EST-MCNC: 157 MG/DL
HBA1C MFR BLD: 7.1 % (ref 4.8–5.6)

## 2022-09-07 ENCOUNTER — OFFICE VISIT (OUTPATIENT)
Dept: ENDOCRINOLOGY | Age: 67
End: 2022-09-07
Payer: COMMERCIAL

## 2022-09-07 VITALS
SYSTOLIC BLOOD PRESSURE: 127 MMHG | BODY MASS INDEX: 31.37 KG/M2 | HEART RATE: 66 BPM | TEMPERATURE: 98.2 F | HEIGHT: 68 IN | OXYGEN SATURATION: 97 % | DIASTOLIC BLOOD PRESSURE: 61 MMHG | WEIGHT: 207 LBS

## 2022-09-07 DIAGNOSIS — E78.2 MIXED HYPERLIPIDEMIA: ICD-10-CM

## 2022-09-07 DIAGNOSIS — I10 ESSENTIAL HYPERTENSION: ICD-10-CM

## 2022-09-07 DIAGNOSIS — E03.4 HYPOTHYROIDISM DUE TO ACQUIRED ATROPHY OF THYROID: ICD-10-CM

## 2022-09-07 DIAGNOSIS — E11.65 TYPE 2 DIABETES MELLITUS WITH HYPERGLYCEMIA, WITHOUT LONG-TERM CURRENT USE OF INSULIN (HCC): Primary | ICD-10-CM

## 2022-09-07 PROCEDURE — 99214 OFFICE O/P EST MOD 30 MIN: CPT | Performed by: INTERNAL MEDICINE

## 2022-09-07 PROCEDURE — 3051F HG A1C>EQUAL 7.0%<8.0%: CPT | Performed by: INTERNAL MEDICINE

## 2022-09-07 PROCEDURE — 1123F ACP DISCUSS/DSCN MKR DOCD: CPT | Performed by: INTERNAL MEDICINE

## 2022-09-07 RX ORDER — LEVOTHYROXINE SODIUM 200 UG/1
200 TABLET ORAL
Qty: 90 TABLET | Refills: 3 | Status: SHIPPED | OUTPATIENT
Start: 2022-09-07

## 2022-09-07 RX ORDER — LANCETS
EACH MISCELLANEOUS
Qty: 300 EACH | Refills: 3 | Status: SHIPPED | OUTPATIENT
Start: 2022-09-07

## 2022-09-07 RX ORDER — METFORMIN HYDROCHLORIDE 1000 MG/1
1000 TABLET ORAL 2 TIMES DAILY WITH MEALS
Qty: 180 TABLET | Refills: 3 | Status: SHIPPED | OUTPATIENT
Start: 2022-09-07

## 2022-09-07 RX ORDER — CLOTRIMAZOLE AND BETAMETHASONE DIPROPIONATE 10; .64 MG/G; MG/G
CREAM TOPICAL
COMMUNITY
Start: 2022-08-26 | End: 2022-09-07

## 2022-09-07 RX ORDER — LISINOPRIL 20 MG/1
20 TABLET ORAL DAILY
Qty: 90 TABLET | Refills: 3 | Status: SHIPPED | OUTPATIENT
Start: 2022-09-07

## 2022-09-07 RX ORDER — BLOOD SUGAR DIAGNOSTIC
STRIP MISCELLANEOUS
Qty: 300 STRIP | Refills: 3 | Status: SHIPPED | OUTPATIENT
Start: 2022-09-07

## 2022-09-07 NOTE — PROGRESS NOTES
1. Have you been to the ER, urgent care clinic since your last visit? Hospitalized since your last visit? No    2. Have you seen or consulted any other health care providers outside of the [address] [place] [address] since your last visit? Include any pap smears or colon screening.  No  Chief Complaint   Patient presents with    Diabetes    Thyroid Problem     Visit Vitals  /61 (BP 1 Location: Left upper arm, BP Patient Position: Sitting, BP Cuff Size: Large adult)   Pulse 66   Temp 98.2 °F (36.8 °C) (Temporal)   Ht 5' 8\" (1.727 m)   Wt 207 lb (93.9 kg)   SpO2 97%   BMI 31.47 kg/m²

## 2022-09-07 NOTE — LETTER
9/7/2022    Patient: Patricio Evans   YOB: 1955   Date of Visit: 9/7/2022     [address] [id number]  Via Fax: [phone]    Dear Adriana Sherwood MD,      Thank you for referring Mr. Lee Shin to [address] [place] [address] for evaluation. My notes for this consultation are attached. If you have questions, please do not hesitate to call me. I look forward to following your patient along with you.       Sincerely,    Shelby Pack MD

## 2022-09-07 NOTE — PROGRESS NOTES
Kate Bolton is a 78 y.o. male here for   Chief Complaint   Patient presents with    Diabetes    Thyroid Problem       1. Have you been to the ER, urgent care clinic since your last visit? Hospitalized since your last visit? -    2. Have you seen or consulted any other health care providers outside of the [address] [place] [address] since your last visit?   Include any pap smears or colon screening.-

## 2022-09-07 NOTE — PROGRESS NOTES
Ron York MD          Patient Information  Date:9/7/2022  Name : Kenia Knight 78 y.o.     YOB: 1955         Referred by: Ryan Tian MD         Chief Complaint   Patient presents with    Diabetes    Thyroid Problem       History of Present Illness: Kenia Knight is a 78 y.o. male here for  Follow-up of  Type 2 Diabetes Mellitus. DM dx 2006   He was on sulfonylureas which was discontinued due to hypoglycemia. On Farxiga  and metformin  no frequent yeast infections    LFts was mildly abnormal -on vitamin E. Checking glucose at home 2- 3 times a day   Compliant with the medications      He was referred by Dr. Bobo Gonzalez for the management of type 2 diabetes. He was seen by Dr. River Ferrera in 2010 for hyperthyroidism, status post radioactive iodine therapy. He is currently hypothyroid on Levothyroxine replacement. Wt Readings from Last 3 Encounters:   09/07/22 207 lb (93.9 kg)   05/04/22 211 lb (95.7 kg)   08/23/21 214 lb (97.1 kg)       BP Readings from Last 3 Encounters:   09/07/22 127/61   05/04/22 (!) 125/58   08/23/21 118/65           Past Medical History:   Diagnosis Date    Diabetes ([facility] [place] [facility]     Hyperlipidemia LDL goal < 100     Hyperthyroidism 20 yrs ago    treated with MANZANARES    Hypothyroidism     s/p MANZANARES     Current Outpatient Medications   Medication Sig    metFORMIN (GLUCOPHAGE) 1,000 mg tablet Take 1 Tablet by mouth two (2) times daily (with meals). lisinopriL (PRINIVIL, ZESTRIL) 20 mg tablet Take 1 Tablet by mouth daily. levothyroxine (SYNTHROID) 200 mcg tablet Take 1 Tablet by mouth Daily (before breakfast). Except Sunday    lancets misc Use to test blood sugar 3 times daily Dx Code: E11.65    glucose blood VI test strips (OneTouch Verio test strips) strip Test TID Dx Code: E11.65    dapagliflozin (Farxiga) 10 mg tab tablet Take 1 Tablet by mouth daily.     omega-3 fatty acids-vitamin e 1,000 mg cap Take 1 Cap by mouth daily. POTASSIUM (POTASSIMIN PO) Take  by mouth. aspirin 81 mg chewable tablet Take 81 mg by mouth. Patient states three times per week    clotrimazole-betamethasone (LOTRISONE) topical cream APPLY CREAM TOPICALLY TO AFFECTED AREA TWICE DAILY (Patient not taking: Reported on 9/7/2022)     No current facility-administered medications for this visit. Allergies   Allergen Reactions    Glyburide Other (comments)     hypoglycemic    Crestor [Rosuvastatin] Nausea Only    Quinolones Other (comments)    Statins-Hmg-Coa Reductase Inhibitors Other (comments)    Neuromuscular Blockers, Steroidal Other (comments)     dizziness         Review of Systems:  HPI    Physical Examination:   Blood pressure 127/61, pulse 66, temperature 98.2 °F (36.8 °C), temperature source Temporal, height 5' 8\" (1.727 m), weight 207 lb (93.9 kg), SpO2 97 %. Estimated body mass index is 31.47 kg/m² as calculated from the following:    Height as of this encounter: 5' 8\" (1.727 m). Weight as of this encounter: 207 lb (93.9 kg).   General: pleasant, no distress, good eye contact  HEENT: no exophthalmos, no periorbital edema, EOMI  Neck: No visible thyromegaly  CVS: S1-S2 regular  RS: Normal respiratory effort  Musculoskeletal: no tremors  Neurological: alert and oriented  Psychiatric: normal mood and affect  Skin: Normal color    Diabetic foot exam: April 2021    Left:     Vibratory sensation normal    Filament test normal sensation with micro filament   Pulse DP: 1+    Deformities: None  Right:    Vibratory sensation normal   Filament test normal sensation with micro filament   Pulse DP: 1+   Deformities: None      Data Reviewed:         Lab Results   Component Value Date/Time    Hemoglobin A1c 7.1 (H) 08/31/2022 08:19 AM    Hemoglobin A1c 7.1 (H) 04/27/2022 08:56 AM    Hemoglobin A1c 7.4 (H) 12/29/2021 08:12 AM    Glucose 138 (H) 04/27/2022 08:56 AM    Glucose POC 98 06/05/2014 09:35 AM    Microalb/Creat ratio (ug/mg creat.) 55 (H) 04/27/2022 08:56 AM    LDL,Direct 95 04/27/2022 08:56 AM    LDL, calculated 97 12/29/2021 08:12 AM    LDL, calculated 95 07/23/2019 09:14 AM    Creatinine 0.80 04/27/2022 08:56 AM      Lab Results   Component Value Date/Time    Cholesterol, total 174 12/29/2021 08:12 AM    HDL Cholesterol 43 12/29/2021 08:12 AM    LDL,Direct 95 04/27/2022 08:56 AM    LDL, calculated 97 12/29/2021 08:12 AM    LDL, calculated 95 07/23/2019 09:14 AM    Triglyceride 199 (H) 12/29/2021 08:12 AM     Lab Results   Component Value Date/Time    ALT (SGPT) 58 (H) 04/27/2022 08:56 AM    Alk. phosphatase 88 04/27/2022 08:56 AM    Bilirubin, total 0.3 04/27/2022 08:56 AM     Lab Results   Component Value Date/Time    GFR est  12/29/2021 08:12 AM    GFR est non-AA 91 12/29/2021 08:12 AM    Creatinine 0.80 04/27/2022 08:56 AM    BUN 21 04/27/2022 08:56 AM    Sodium 141 04/27/2022 08:56 AM    Potassium 4.5 04/27/2022 08:56 AM    Chloride 101 04/27/2022 08:56 AM    CO2 24 04/27/2022 08:56 AM      Lab Results   Component Value Date/Time    TSH 0.587 11/06/2018 08:11 AM        Assessment/Plan:     1. Type 2 Diabetes Mellitus  Lab Results   Component Value Date/Time    Hemoglobin A1c 7.1 (H) 08/31/2022 08:19 AM    Hemoglobin A1c (POC) 6.1 06/05/2014 09:35 AM   Controlled, CONSTANCE phenomenon  Continue Metformin  Susana Griselda, was on Januvia in the past  Eye exam 2020 -no retinopathy  FLU annually ,Pneumovax ,aspirin daily,annual eye exam,microalbumin    2. HTN : Continue current therapy . 3. Post ablative hypothyroidism-continue levothyroxine,none  on LT4 on Sunday   Gluten sensitivity     4. Obesity:Body mass index is 31.47 kg/m². 5 Hyperlipidemia - diet controlled  Refused statin - understands the benefits      6 Transaminitis - NAFLD  Vit E  Hepatitis panel negative      Plantar fascitis - had surgery 25 years ago     Thank you for allowing me to participate in the care of this patient.     Dallas Waldron MD    Patient verbalized luma-id    Voice-recognition software was used to generate this report, which may result in some phonetic-based errors in the grammar and contents. Even though attempts were made to correct all the mistakes, some may have been missed and remained in the body of the report.

## 2022-12-01 DIAGNOSIS — E11.65 TYPE 2 DIABETES MELLITUS WITH HYPERGLYCEMIA, WITHOUT LONG-TERM CURRENT USE OF INSULIN (HCC): ICD-10-CM

## 2022-12-01 RX ORDER — DAPAGLIFLOZIN 10 MG/1
TABLET, FILM COATED ORAL
Qty: 30 TABLET | Refills: 4 | Status: SHIPPED | OUTPATIENT
Start: 2022-12-01

## 2023-01-04 ENCOUNTER — TELEPHONE (OUTPATIENT)
Dept: ENDOCRINOLOGY | Age: 68
End: 2023-01-04

## 2023-01-04 NOTE — TELEPHONE ENCOUNTER
Per  patient needs to be informed of the following . Blood tests are good. Per Dr. Krishan Chavez, informed pt of result note, as noted above. Pt verbalized understanding with no further questions or concerns at this time.

## 2023-01-30 DIAGNOSIS — I10 ESSENTIAL HYPERTENSION: ICD-10-CM

## 2023-01-30 DIAGNOSIS — E11.65 TYPE 2 DIABETES MELLITUS WITH HYPERGLYCEMIA, WITHOUT LONG-TERM CURRENT USE OF INSULIN (HCC): ICD-10-CM

## 2023-01-30 RX ORDER — LISINOPRIL 20 MG/1
20 TABLET ORAL DAILY
Qty: 90 TABLET | Refills: 3 | Status: SHIPPED | OUTPATIENT
Start: 2023-01-30

## 2023-01-30 RX ORDER — LEVOTHYROXINE SODIUM 200 UG/1
200 TABLET ORAL
Qty: 90 TABLET | Refills: 3 | Status: SHIPPED | OUTPATIENT
Start: 2023-01-30

## 2023-01-30 RX ORDER — METFORMIN HYDROCHLORIDE 1000 MG/1
1000 TABLET ORAL 2 TIMES DAILY WITH MEALS
Qty: 180 TABLET | Refills: 3 | Status: SHIPPED | OUTPATIENT
Start: 2023-01-30

## 2023-02-03 ENCOUNTER — TELEPHONE (OUTPATIENT)
Dept: ENDOCRINOLOGY | Age: 68
End: 2023-02-03

## 2023-02-03 DIAGNOSIS — I10 ESSENTIAL HYPERTENSION: ICD-10-CM

## 2023-02-03 DIAGNOSIS — E11.65 TYPE 2 DIABETES MELLITUS WITH HYPERGLYCEMIA, WITHOUT LONG-TERM CURRENT USE OF INSULIN (HCC): Primary | ICD-10-CM

## 2023-02-03 RX ORDER — BLOOD-GLUCOSE METER
EACH MISCELLANEOUS
Qty: 1 EACH | Refills: 0 | Status: SHIPPED | OUTPATIENT
Start: 2023-02-03

## 2023-02-03 RX ORDER — LANCETS
EACH MISCELLANEOUS
Qty: 300 EACH | Refills: 3 | Status: SHIPPED | OUTPATIENT
Start: 2023-02-03

## 2023-02-03 RX ORDER — BLOOD GLUCOSE CONTROL HIGH,LOW
EACH MISCELLANEOUS
Qty: 1 EACH | Refills: 1 | Status: SHIPPED | OUTPATIENT
Start: 2023-02-03

## 2023-02-03 RX ORDER — BLOOD SUGAR DIAGNOSTIC
STRIP MISCELLANEOUS
Qty: 300 STRIP | Refills: 3 | Status: SHIPPED | OUTPATIENT
Start: 2023-02-03

## 2023-02-06 DIAGNOSIS — E11.65 TYPE 2 DIABETES MELLITUS WITH HYPERGLYCEMIA, WITHOUT LONG-TERM CURRENT USE OF INSULIN (HCC): Primary | ICD-10-CM

## 2023-02-06 RX ORDER — CALCIUM CITRATE/VITAMIN D3 200MG-6.25
TABLET ORAL
Qty: 300 STRIP | Refills: 3 | Status: SHIPPED | OUTPATIENT
Start: 2023-02-06

## 2023-02-06 RX ORDER — LANCETS 33 GAUGE
EACH MISCELLANEOUS
Qty: 300 EACH | Refills: 3 | Status: SHIPPED | OUTPATIENT
Start: 2023-02-06

## 2023-02-06 RX ORDER — INSULIN PUMP SYRINGE, 3 ML
EACH MISCELLANEOUS
Qty: 1 KIT | Refills: 0 | Status: SHIPPED | OUTPATIENT
Start: 2023-02-06

## 2023-02-06 RX ORDER — BLOOD-GLUCOSE METER
EACH MISCELLANEOUS
Qty: 1 EACH | Refills: 1 | Status: SHIPPED | OUTPATIENT
Start: 2023-02-06

## 2023-02-06 RX ORDER — ISOPROPYL ALCOHOL 70 ML/100ML
SWAB TOPICAL
Qty: 300 PAD | Refills: 3 | Status: SHIPPED | OUTPATIENT
Start: 2023-02-06

## 2023-02-07 DIAGNOSIS — E11.65 TYPE 2 DIABETES MELLITUS WITH HYPERGLYCEMIA, WITHOUT LONG-TERM CURRENT USE OF INSULIN (HCC): ICD-10-CM

## 2023-02-07 DIAGNOSIS — I10 ESSENTIAL HYPERTENSION: ICD-10-CM

## 2023-02-07 RX ORDER — METFORMIN HYDROCHLORIDE 1000 MG/1
1000 TABLET ORAL 2 TIMES DAILY WITH MEALS
Qty: 180 TABLET | Refills: 3 | Status: SHIPPED | OUTPATIENT
Start: 2023-02-07

## 2023-02-07 RX ORDER — LISINOPRIL 20 MG/1
20 TABLET ORAL DAILY
Qty: 90 TABLET | Refills: 3 | Status: SHIPPED | OUTPATIENT
Start: 2023-02-07

## 2023-02-07 RX ORDER — LEVOTHYROXINE SODIUM 200 UG/1
200 TABLET ORAL
Qty: 90 TABLET | Refills: 3 | Status: SHIPPED | OUTPATIENT
Start: 2023-02-07

## 2023-02-13 DIAGNOSIS — E11.65 TYPE 2 DIABETES MELLITUS WITH HYPERGLYCEMIA, WITHOUT LONG-TERM CURRENT USE OF INSULIN (HCC): ICD-10-CM

## 2023-02-13 RX ORDER — CALCIUM CITRATE/VITAMIN D3 200MG-6.25
TABLET ORAL
Qty: 300 STRIP | Refills: 3 | Status: SHIPPED | OUTPATIENT
Start: 2023-02-13

## 2023-02-13 RX ORDER — BLOOD-GLUCOSE METER
EACH MISCELLANEOUS
Qty: 1 EACH | Refills: 0 | Status: SHIPPED | OUTPATIENT
Start: 2023-02-13

## 2023-02-13 RX ORDER — ISOPROPYL ALCOHOL 70 ML/100ML
SWAB TOPICAL
Qty: 300 PAD | Refills: 3 | Status: SHIPPED | OUTPATIENT
Start: 2023-02-13

## 2023-02-13 RX ORDER — BLOOD-GLUCOSE METER
EACH MISCELLANEOUS
Qty: 1 EACH | Refills: 1 | Status: SHIPPED | OUTPATIENT
Start: 2023-02-13

## 2023-02-13 RX ORDER — LANCETS 33 GAUGE
EACH MISCELLANEOUS
Qty: 300 EACH | Refills: 3 | Status: SHIPPED | OUTPATIENT
Start: 2023-02-13

## 2023-04-24 ENCOUNTER — OFFICE VISIT (OUTPATIENT)
Dept: ENDOCRINOLOGY | Age: 68
End: 2023-04-24
Payer: MEDICARE

## 2023-04-24 VITALS
HEART RATE: 83 BPM | DIASTOLIC BLOOD PRESSURE: 49 MMHG | SYSTOLIC BLOOD PRESSURE: 108 MMHG | OXYGEN SATURATION: 96 % | RESPIRATION RATE: 18 BRPM | BODY MASS INDEX: 30.28 KG/M2 | WEIGHT: 199.8 LBS | HEIGHT: 68 IN | TEMPERATURE: 98 F

## 2023-04-24 DIAGNOSIS — E11.65 TYPE 2 DIABETES MELLITUS WITH HYPERGLYCEMIA, WITHOUT LONG-TERM CURRENT USE OF INSULIN (HCC): Primary | ICD-10-CM

## 2023-04-24 DIAGNOSIS — I10 ESSENTIAL HYPERTENSION: ICD-10-CM

## 2023-04-24 DIAGNOSIS — E78.2 MIXED HYPERLIPIDEMIA: ICD-10-CM

## 2023-04-24 LAB — HBA1C MFR BLD HPLC: 6.6 %

## 2023-04-24 PROCEDURE — 3074F SYST BP LT 130 MM HG: CPT | Performed by: INTERNAL MEDICINE

## 2023-04-24 PROCEDURE — 99214 OFFICE O/P EST MOD 30 MIN: CPT | Performed by: INTERNAL MEDICINE

## 2023-04-24 PROCEDURE — 3044F HG A1C LEVEL LT 7.0%: CPT | Performed by: INTERNAL MEDICINE

## 2023-04-24 PROCEDURE — G8510 SCR DEP NEG, NO PLAN REQD: HCPCS | Performed by: INTERNAL MEDICINE

## 2023-04-24 PROCEDURE — G8427 DOCREV CUR MEDS BY ELIG CLIN: HCPCS | Performed by: INTERNAL MEDICINE

## 2023-04-24 PROCEDURE — G8417 CALC BMI ABV UP PARAM F/U: HCPCS | Performed by: INTERNAL MEDICINE

## 2023-04-24 PROCEDURE — 83036 HEMOGLOBIN GLYCOSYLATED A1C: CPT | Performed by: INTERNAL MEDICINE

## 2023-04-24 PROCEDURE — 1123F ACP DISCUSS/DSCN MKR DOCD: CPT | Performed by: INTERNAL MEDICINE

## 2023-04-24 PROCEDURE — 3078F DIAST BP <80 MM HG: CPT | Performed by: INTERNAL MEDICINE

## 2023-04-24 PROCEDURE — 1101F PT FALLS ASSESS-DOCD LE1/YR: CPT | Performed by: INTERNAL MEDICINE

## 2023-04-24 PROCEDURE — 3017F COLORECTAL CA SCREEN DOC REV: CPT | Performed by: INTERNAL MEDICINE

## 2023-04-24 PROCEDURE — G8536 NO DOC ELDER MAL SCRN: HCPCS | Performed by: INTERNAL MEDICINE

## 2023-04-24 PROCEDURE — 2022F DILAT RTA XM EVC RTNOPTHY: CPT | Performed by: INTERNAL MEDICINE

## 2023-04-24 RX ORDER — LANOLIN ALCOHOL/MO/W.PET/CERES
CREAM (GRAM) TOPICAL
COMMUNITY

## 2023-04-24 NOTE — PROGRESS NOTES
Vishal Andrews MD          Patient Information  Date:4/24/2023  Name : Wes Pruitt 78 y.o.     YOB: 1955         Referred by: Cortes Ingram MD     Chief Complaint   Patient presents with    Diabetes     History of Present Illness: Wes Pruitt is a 78 y.o. male here for  Follow-up of  Type 2 Diabetes Mellitus. 4/24/23  Checking BG twice daily - AM <130, PM <180  No severe hypoglycemia  Frequent urination, staying hydrated  POC A1C today  No chest pain  Eye exam - up to date, hole in retina in L eye    Prior history  DM dx 2006  He was on sulfonylureas which was discontinued due to hypoglycemia. On Farxiga  and metformin  no frequent yeast infections    LFts was mildly abnormal -on vitamin E. Checking glucose at home 2- 3 times a day   Compliant with the medications    He was referred by Dr. Valeri Mukherjee for the management of type 2 diabetes. He was seen by Dr. Karlton Denver in 2010 for hyperthyroidism, status post radioactive iodine therapy. He is currently hypothyroid on Levothyroxine replacement. Wt Readings from Last 3 Encounters:   04/24/23 199 lb 12.8 oz (90.6 kg)   09/07/22 207 lb (93.9 kg)   05/04/22 211 lb (95.7 kg)       BP Readings from Last 3 Encounters:   04/24/23 (!) 108/49   09/07/22 127/61   05/04/22 (!) 125/58           Past Medical History:   Diagnosis Date    Diabetes ([facility] [place] [facility]     Hyperlipidemia LDL goal < 100     Hyperthyroidism 20 yrs ago    treated with MANZANARES    Hypothyroidism     s/p MANZANARES     Current Outpatient Medications   Medication Sig    magnesium oxide (MAG-OX) 400 mg tablet Take  by mouth.  Two tablets daily - a total of 410 mg    alcohol swabs (BD Single Use Swabs Regular) padm Use to clean finger prior to collecting blood sample for testing 3 times a day dx code 11.65    Blood Glucose Control, Low (True Metrix Level 1) soln Use as directed to re calibrate true metrix meter dx code E.11.65 Blood-Glucose Meter (True Metrix Air Glucose Meter) misc Use as directed to test blood sugars 3 times a day dx code E11.65    lancets (TRUEplus Lancets) 33 gauge misc Use to test blood sugar 3 times daily Dx Code: E11.65    glucose blood VI test strips (True Metrix Glucose Test Strip) strip Use as directed to check blood sugar 3 times daily dx code E11.65    lisinopriL (PRINIVIL, ZESTRIL) 20 mg tablet Take 1 Tablet by mouth daily. levothyroxine (SYNTHROID) 200 mcg tablet Take 1 Tablet by mouth Daily (before breakfast). Except Sunday    dapagliflozin (Farxiga) 10 mg tab tablet Take 1 Tablet by mouth daily. metFORMIN (GLUCOPHAGE) 1,000 mg tablet Take 1 Tablet by mouth two (2) times daily (with meals). Blood-Glucose Meter (True Metrix Air Glucose Meter) monitoring kit Use as directed to test blood sugars 3 times a day dx code E11.65    glucose blood VI test strips (True Metrix Glucose Test Strip) strip Use as directed to check blood sugar 3 times daily dx code E11.65    Blood Glucose Control High&Low (Accu-Chek Guide L1-L2 Ctrl Sol) soln Use as directed to calibrate machine dx code E11.65    lancets misc Use to test blood sugar 3 times daily Dx Code: E11.65    omega-3 fatty acids-vitamin e 1,000 mg cap Take 1 Capsule by mouth daily. POTASSIUM (POTASSIMIN PO) Take  by mouth. aspirin 81 mg chewable tablet Take 1 Tablet by mouth. Patient states three times per week     No current facility-administered medications for this visit. Allergies   Allergen Reactions    Glyburide Other (comments)     hypoglycemic    Crestor [Rosuvastatin] Nausea Only    Quinolones Other (comments)    Statins-Hmg-Coa Reductase Inhibitors Other (comments)    Neuromuscular Blockers, Steroidal Other (comments)     dizziness         Review of Systems:  HPI    Physical Examination:   Blood pressure (!) 108/49, pulse 83, temperature 98 °F (36.7 °C), temperature source Temporal, resp.  rate 18, height 5' 8\" (1.727 m), weight 199 lb 12.8 oz (90.6 kg), SpO2 96 %. Estimated body mass index is 30.38 kg/m² as calculated from the following:    Height as of this encounter: 5' 8\" (1.727 m). Weight as of this encounter: 199 lb 12.8 oz (90.6 kg). General: pleasant, no distress, good eye contact  HEENT: no exophthalmos, no periorbital edema, EOMI  Neck: No visible thyromegaly  CVS: S1-S2 regular  RS: Normal respiratory effort  Musculoskeletal: no tremors  Neurological: alert and oriented  Psychiatric: normal mood and affect  Skin: Normal color    Diabetic foot exam: April 2023    Left:     Vibratory sensation normal    Filament test normal sensation with micro filament   Pulse DP: 1+    Deformities: None  Right:    Vibratory sensation normal   Filament test normal sensation with micro filament   Pulse DP: 1+   Deformities: None      Data Reviewed:         Lab Results   Component Value Date/Time    Hemoglobin A1c 7.3 (H) 12/29/2022 09:12 AM    Hemoglobin A1c 7.1 (H) 08/31/2022 08:19 AM    Hemoglobin A1c 7.1 (H) 04/27/2022 08:56 AM    Glucose 133 (H) 12/29/2022 09:12 AM    Glucose POC 98 06/05/2014 09:35 AM    Microalb/Creat ratio (ug/mg creat.) 55 (H) 04/27/2022 08:56 AM    LDL,Direct 95 04/27/2022 08:56 AM    LDL, calculated 97 12/29/2021 08:12 AM    LDL, calculated 95 07/23/2019 09:14 AM    Creatinine 0.82 12/29/2022 09:12 AM      Lab Results   Component Value Date/Time    Cholesterol, total 174 12/29/2021 08:12 AM    HDL Cholesterol 43 12/29/2021 08:12 AM    LDL,Direct 95 04/27/2022 08:56 AM    LDL, calculated 97 12/29/2021 08:12 AM    LDL, calculated 95 07/23/2019 09:14 AM    Triglyceride 199 (H) 12/29/2021 08:12 AM     Lab Results   Component Value Date/Time    ALT (SGPT) 58 (H) 04/27/2022 08:56 AM    Alk.  phosphatase 88 04/27/2022 08:56 AM    Bilirubin, total 0.3 04/27/2022 08:56 AM     Lab Results   Component Value Date/Time    GFR est  12/29/2021 08:12 AM    GFR est non-AA 91 12/29/2021 08:12 AM    Creatinine 0.82 12/29/2022 09:12 AM    BUN 20 12/29/2022 09:12 AM    Sodium 139 12/29/2022 09:12 AM    Potassium 4.5 12/29/2022 09:12 AM    Chloride 101 12/29/2022 09:12 AM    CO2 24 12/29/2022 09:12 AM      Lab Results   Component Value Date/Time    TSH 0.935 12/29/2022 09:12 AM        Assessment/Plan:     1. Type 2 Diabetes Mellitus  Lab Results   Component Value Date/Time    Hemoglobin A1c 7.3 (H) 12/29/2022 09:12 AM    Hemoglobin A1c (POC) 6.1 06/05/2014 09:35 AM   Controlled, CONSTANCE phenomenon  Continue Metformin  Adrianna Ortiz, was on Januvia in the past  Eye exam 2020 -no retinopathy  FLU annually ,Pneumovax ,aspirin daily,annual eye exam,microalbumin    2. HTN : Continue current therapy . 3. Post ablative hypothyroidism-continue levothyroxine,none  on LT4 on Sunday   Gluten sensitivity     4. Obesity:Body mass index is 30.38 kg/m². 5 Hyperlipidemia - diet controlled  Refused statin - understands the benefits      6 Transaminitis - NAFLD  Vit E  Hepatitis panel negative      Plantar fascitis - had surgery 25 years ago     Thank you for allowing me to participate in the care of this patient. Silvano Morrow MD    Patient verbalized understanding    Voice-recognition software was used to generate this report, which may result in some phonetic-based errors in the grammar and contents. Even though attempts were made to correct all the mistakes, some may have been missed and remained in the body of the report.

## 2023-04-24 NOTE — PROGRESS NOTES
POC A1C order placed for patient per verbal order with read back from [facility], 04/24/23.

## 2023-04-24 NOTE — PROGRESS NOTES
Tasha Blanco is a 78 y.o. male here for   Chief Complaint   Patient presents with    Diabetes       1. Have you been to the ER or an urgent care clinic since your last visit?  - no    2. Have you been hospitalized since your last visit? - no    3. Have you seen or consulted any other health care providers outside of the [address] [place] [address] since your last visit?   Include any pap smears or colon screening.- no

## 2023-05-09 DIAGNOSIS — E78.2 MIXED HYPERLIPIDEMIA: ICD-10-CM

## 2023-05-09 DIAGNOSIS — E11.65 TYPE 2 DIABETES MELLITUS WITH HYPERGLYCEMIA, WITHOUT LONG-TERM CURRENT USE OF INSULIN (HCC): Primary | ICD-10-CM

## 2023-05-22 RX ORDER — ASPIRIN 81 MG/1
81 TABLET, CHEWABLE ORAL
COMMUNITY

## 2023-05-22 RX ORDER — LISINOPRIL 20 MG/1
20 TABLET ORAL DAILY
COMMUNITY
Start: 2023-02-07

## 2023-05-22 RX ORDER — LANCETS 30 GAUGE
EACH MISCELLANEOUS
COMMUNITY
Start: 2023-02-03

## 2023-05-22 RX ORDER — LEVOTHYROXINE SODIUM 0.2 MG/1
200 TABLET ORAL
COMMUNITY
Start: 2023-02-07

## 2023-05-22 RX ORDER — MAGNESIUM OXIDE 400 MG/1
TABLET ORAL
COMMUNITY

## 2023-06-23 ENCOUNTER — TELEPHONE (OUTPATIENT)
Age: 68
End: 2023-06-23

## 2023-06-23 DIAGNOSIS — E11.65 TYPE 2 DIABETES MELLITUS WITH HYPERGLYCEMIA, WITHOUT LONG-TERM CURRENT USE OF INSULIN (HCC): Primary | ICD-10-CM

## 2023-06-23 NOTE — TELEPHONE ENCOUNTER
Called pt to get clarification. Pt stated Wyona Fresh is too expensive and needs an alternative sent to pharmacy.

## 2023-09-19 LAB
ALBUMIN/CREAT UR: 45 MG/G CREAT (ref 0–29)
BUN SERPL-MCNC: 21 MG/DL (ref 8–27)
BUN/CREAT SERPL: 28 (ref 10–24)
CALCIUM SERPL-MCNC: 9.5 MG/DL (ref 8.6–10.2)
CHLORIDE SERPL-SCNC: 100 MMOL/L (ref 96–106)
CHOLEST SERPL-MCNC: 149 MG/DL (ref 100–199)
CO2 SERPL-SCNC: 26 MMOL/L (ref 20–29)
CREAT SERPL-MCNC: 0.75 MG/DL (ref 0.76–1.27)
CREAT UR-MCNC: 75.6 MG/DL
EGFRCR SERPLBLD CKD-EPI 2021: 99 ML/MIN/1.73
EST. AVERAGE GLUCOSE BLD GHB EST-MCNC: 151 MG/DL
GLUCOSE SERPL-MCNC: 133 MG/DL (ref 70–99)
HBA1C MFR BLD: 6.9 % (ref 4.8–5.6)
HDLC SERPL-MCNC: 48 MG/DL
IMP & REVIEW OF LAB RESULTS: NORMAL
LDLC SERPL CALC-MCNC: 78 MG/DL (ref 0–99)
MICROALBUMIN UR-MCNC: 34.3 UG/ML
POTASSIUM SERPL-SCNC: 4.5 MMOL/L (ref 3.5–5.2)
SODIUM SERPL-SCNC: 141 MMOL/L (ref 134–144)
TRIGL SERPL-MCNC: 128 MG/DL (ref 0–149)
VLDLC SERPL CALC-MCNC: 23 MG/DL (ref 5–40)

## 2023-09-25 ENCOUNTER — OFFICE VISIT (OUTPATIENT)
Age: 68
End: 2023-09-25
Payer: MEDICARE

## 2023-09-25 VITALS
HEART RATE: 76 BPM | SYSTOLIC BLOOD PRESSURE: 110 MMHG | RESPIRATION RATE: 20 BRPM | HEIGHT: 68 IN | DIASTOLIC BLOOD PRESSURE: 58 MMHG | BODY MASS INDEX: 30.49 KG/M2 | OXYGEN SATURATION: 95 % | WEIGHT: 201.2 LBS | TEMPERATURE: 98.1 F

## 2023-09-25 DIAGNOSIS — E03.4 ATROPHY OF THYROID (ACQUIRED): Primary | ICD-10-CM

## 2023-09-25 DIAGNOSIS — Z53.20 STATIN DECLINED: ICD-10-CM

## 2023-09-25 DIAGNOSIS — E78.2 MIXED HYPERLIPIDEMIA: ICD-10-CM

## 2023-09-25 DIAGNOSIS — E11.65 TYPE 2 DIABETES MELLITUS WITH HYPERGLYCEMIA, WITHOUT LONG-TERM CURRENT USE OF INSULIN (HCC): Primary | ICD-10-CM

## 2023-09-25 DIAGNOSIS — E11.65 TYPE 2 DIABETES MELLITUS WITH HYPERGLYCEMIA, WITHOUT LONG-TERM CURRENT USE OF INSULIN (HCC): ICD-10-CM

## 2023-09-25 DIAGNOSIS — I10 ESSENTIAL (PRIMARY) HYPERTENSION: ICD-10-CM

## 2023-09-25 PROCEDURE — 3078F DIAST BP <80 MM HG: CPT | Performed by: INTERNAL MEDICINE

## 2023-09-25 PROCEDURE — 2022F DILAT RTA XM EVC RTNOPTHY: CPT | Performed by: INTERNAL MEDICINE

## 2023-09-25 PROCEDURE — 3074F SYST BP LT 130 MM HG: CPT | Performed by: INTERNAL MEDICINE

## 2023-09-25 PROCEDURE — 99214 OFFICE O/P EST MOD 30 MIN: CPT | Performed by: INTERNAL MEDICINE

## 2023-09-25 PROCEDURE — 3017F COLORECTAL CA SCREEN DOC REV: CPT | Performed by: INTERNAL MEDICINE

## 2023-09-25 PROCEDURE — 1036F TOBACCO NON-USER: CPT | Performed by: INTERNAL MEDICINE

## 2023-09-25 PROCEDURE — 3044F HG A1C LEVEL LT 7.0%: CPT | Performed by: INTERNAL MEDICINE

## 2023-09-25 PROCEDURE — G8427 DOCREV CUR MEDS BY ELIG CLIN: HCPCS | Performed by: INTERNAL MEDICINE

## 2023-09-25 PROCEDURE — 1123F ACP DISCUSS/DSCN MKR DOCD: CPT | Performed by: INTERNAL MEDICINE

## 2023-09-25 PROCEDURE — G8417 CALC BMI ABV UP PARAM F/U: HCPCS | Performed by: INTERNAL MEDICINE

## 2023-09-25 RX ORDER — ISOPROPYL ALCOHOL 70 ML/100ML
SWAB TOPICAL
COMMUNITY
Start: 2023-08-30

## 2023-09-25 RX ORDER — CALCIUM CITRATE/VITAMIN D3 200MG-6.25
TABLET ORAL
COMMUNITY
Start: 2023-08-30 | End: 2023-09-25 | Stop reason: SDUPTHER

## 2023-09-25 RX ORDER — LEVOTHYROXINE SODIUM 0.2 MG/1
TABLET ORAL
Qty: 90 TABLET | Refills: 3 | Status: SHIPPED | OUTPATIENT
Start: 2023-09-25

## 2023-09-25 RX ORDER — CALCIUM CITRATE/VITAMIN D3 200MG-6.25
TABLET ORAL
Qty: 200 EACH | Refills: 3 | Status: SHIPPED | OUTPATIENT
Start: 2023-09-25

## 2023-09-25 RX ORDER — LANCETS 30 GAUGE
EACH MISCELLANEOUS
Qty: 100 EACH | Refills: 3 | Status: SHIPPED | OUTPATIENT
Start: 2023-09-25

## 2023-09-25 RX ORDER — DAPAGLIFLOZIN 10 MG/1
10 TABLET, FILM COATED ORAL DAILY
COMMUNITY
Start: 2023-09-12 | End: 2023-09-25 | Stop reason: SDUPTHER

## 2023-09-25 RX ORDER — DAPAGLIFLOZIN 10 MG/1
10 TABLET, FILM COATED ORAL DAILY
Qty: 90 TABLET | Refills: 3 | Status: SHIPPED | OUTPATIENT
Start: 2023-09-25

## 2023-11-13 ENCOUNTER — TELEPHONE (OUTPATIENT)
Age: 68
End: 2023-11-13

## 2023-11-13 NOTE — TELEPHONE ENCOUNTER
[facility] called needing clarification on Test strips.   [phone]   give zip code [id number]

## 2023-11-13 NOTE — TELEPHONE ENCOUNTER
Salvador needed to clarify testing times. Lancets said TID and Test strips say BID. Per last note \"Checking BG twice daily\" advised them to fill for BID. No further questions or concerns at this time.

## 2024-01-04 LAB
BUN SERPL-MCNC: 22 MG/DL (ref 8–27)
BUN/CREAT SERPL: 29 (ref 10–24)
CALCIUM SERPL-MCNC: 9.2 MG/DL (ref 8.6–10.2)
CHLORIDE SERPL-SCNC: 100 MMOL/L (ref 96–106)
CO2 SERPL-SCNC: 23 MMOL/L (ref 20–29)
CREAT SERPL-MCNC: 0.75 MG/DL (ref 0.76–1.27)
EGFRCR SERPLBLD CKD-EPI 2021: 99 ML/MIN/1.73
GLUCOSE SERPL-MCNC: 121 MG/DL (ref 70–99)
HBA1C MFR BLD: 6.8 % (ref 4.8–5.6)
POTASSIUM SERPL-SCNC: 4.6 MMOL/L (ref 3.5–5.2)
SODIUM SERPL-SCNC: 138 MMOL/L (ref 134–144)

## 2024-01-12 ENCOUNTER — OFFICE VISIT (OUTPATIENT)
Age: 69
End: 2024-01-12

## 2024-01-12 VITALS
DIASTOLIC BLOOD PRESSURE: 60 MMHG | SYSTOLIC BLOOD PRESSURE: 117 MMHG | BODY MASS INDEX: 30.16 KG/M2 | HEIGHT: 68 IN | RESPIRATION RATE: 20 BRPM | TEMPERATURE: 97.6 F | WEIGHT: 199 LBS | OXYGEN SATURATION: 96 % | HEART RATE: 66 BPM

## 2024-01-12 DIAGNOSIS — E78.2 MIXED HYPERLIPIDEMIA: ICD-10-CM

## 2024-01-12 DIAGNOSIS — E11.65 TYPE 2 DIABETES MELLITUS WITH HYPERGLYCEMIA, WITHOUT LONG-TERM CURRENT USE OF INSULIN (HCC): ICD-10-CM

## 2024-01-12 DIAGNOSIS — E03.4 ATROPHY OF THYROID (ACQUIRED): ICD-10-CM

## 2024-01-12 DIAGNOSIS — I10 ESSENTIAL HYPERTENSION: ICD-10-CM

## 2024-01-12 DIAGNOSIS — E11.65 TYPE 2 DIABETES MELLITUS WITH HYPERGLYCEMIA, UNSPECIFIED WHETHER LONG TERM INSULIN USE (HCC): Primary | ICD-10-CM

## 2024-01-12 NOTE — PROGRESS NOTES
Emeka Mckeon is a 68 y.o. male here for   Chief Complaint   Patient presents with    Diabetes       1. Have you been to the ER, urgent care clinic since your last visit?  Hospitalized since your last visit? - No    2. Have you seen or consulted any other health care providers outside of the [facility] since your last visit?  Include any pap smears or colon screening.- No

## 2024-01-12 NOTE — PROGRESS NOTES
[facility] DIABETES AND ENDOCRINOLOGY                 Lalitha Saelh MD               Patient Information   Date:4/24/2023   Name : Emeka Mckeon 67 y.o.       YOB: 1955           Referred by: Johan Robb MD              History of Present Illness: Emeka Mckeon is a 67 y.o. male here  for  Follow-up of  Type 2 Diabetes Mellitus .   Dx 2006    Doing well on Farxiga     Checking BG twice daily - AM <130, PM <180   No severe hypoglycemia     No chest pain   Eye exam - August 2023              He was referred by Dr. Robb for the management of type 2 diabetes.     He was seen by Dr. Amos in 2010 for hyperthyroidism, status post radioactive iodine therapy.  He is currently hypothyroid on Levothyroxine replacement.                 Wt Readings from Last 3 Encounters:   01/12/24 90.3 kg (199 lb)   09/25/23 91.3 kg (201 lb 3.2 oz)   04/24/23 90.6 kg (199 lb 12.8 oz)        Past Medical History:   Diagnosis Date    Diabetes (HCC)     Hyperlipidemia LDL goal < 100     Hyperthyroidism 20 yrs ago    treated with HACKETT    Hypothyroidism     s/p HACKETT       Current Outpatient Medications   Medication Sig    Alcohol Swabs (DROPSAFE ALCOHOL PREP) 70 % PADS     Omega-3 Fatty Acids (FISH OIL PO) Take by mouth in the morning and at bedtime    Potassium 99 MG TABS Take 1 tablet by mouth daily    FARXIGA 10 MG tablet Take 1 tablet by mouth daily    Lancets MISC Use to test blood sugar 3 times daily Dx Code: E11.65    levothyroxine (SYNTHROID) 200 MCG tablet Takes 1 tablet Mon-Sat, none on Sun    metFORMIN (GLUCOPHAGE) 1000 MG tablet Take 1 tablet by mouth 2 times daily (with meals)    TRUE METRIX BLOOD GLUCOSE TEST strip Used to test BG 2 times daily, dx code E11.65    aspirin 81 MG chewable tablet Take 1 tablet by mouth daily    lisinopril (PRINIVIL;ZESTRIL) 20 MG tablet Take 1 tablet by mouth daily    magnesium oxide (MAG-OX) 400 MG tablet Take 2 tablets by mouth at bedtime     No

## 2024-01-13 RX ORDER — LEVOTHYROXINE SODIUM 0.2 MG/1
TABLET ORAL
Qty: 90 TABLET | Refills: 3 | Status: SHIPPED | OUTPATIENT
Start: 2024-01-13

## 2024-01-13 RX ORDER — LANCETS 30 GAUGE
EACH MISCELLANEOUS
Qty: 200 EACH | Refills: 3 | Status: SHIPPED | OUTPATIENT
Start: 2024-01-13

## 2024-01-13 RX ORDER — ISOPROPYL ALCOHOL 70 ML/100ML
SWAB TOPICAL
Qty: 200 EACH | Refills: 3 | Status: SHIPPED | OUTPATIENT
Start: 2024-01-13

## 2024-01-13 RX ORDER — CALCIUM CITRATE/VITAMIN D3 200MG-6.25
TABLET ORAL
Qty: 200 EACH | Refills: 3 | Status: SHIPPED | OUTPATIENT
Start: 2024-01-13

## 2024-01-13 RX ORDER — DAPAGLIFLOZIN 10 MG/1
10 TABLET, FILM COATED ORAL DAILY
Qty: 90 TABLET | Refills: 3 | Status: SHIPPED | OUTPATIENT
Start: 2024-01-13

## 2024-02-12 DIAGNOSIS — I10 ESSENTIAL (PRIMARY) HYPERTENSION: Primary | ICD-10-CM

## 2024-02-12 RX ORDER — LISINOPRIL 20 MG/1
20 TABLET ORAL DAILY
Qty: 90 TABLET | Refills: 3 | Status: SHIPPED | OUTPATIENT
Start: 2024-02-12

## 2024-03-21 ENCOUNTER — APPOINTMENT (OUTPATIENT)
Facility: HOSPITAL | Age: 69
End: 2024-03-21
Payer: OTHER MISCELLANEOUS

## 2024-03-21 ENCOUNTER — HOSPITAL ENCOUNTER (EMERGENCY)
Facility: HOSPITAL | Age: 69
Discharge: HOME | End: 2024-03-21
Attending: STUDENT IN AN ORGANIZED HEALTH CARE EDUCATION/TRAINING PROGRAM
Payer: OTHER MISCELLANEOUS

## 2024-03-21 VITALS
TEMPERATURE: 98.4 F | DIASTOLIC BLOOD PRESSURE: 66 MMHG | HEIGHT: 68 IN | RESPIRATION RATE: 18 BRPM | SYSTOLIC BLOOD PRESSURE: 125 MMHG | OXYGEN SATURATION: 100 % | HEART RATE: 95 BPM | WEIGHT: 200 LBS | BODY MASS INDEX: 30.31 KG/M2

## 2024-03-21 DIAGNOSIS — S80.12XA CONTUSION OF LEFT LOWER EXTREMITY, INITIAL ENCOUNTER: Primary | ICD-10-CM
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
## 2024-03-21 PROCEDURE — 73590 X-RAY EXAM OF LOWER LEG: CPT

## 2024-03-21 PROCEDURE — 99283 EMERGENCY DEPT VISIT LOW MDM: CPT

## 2024-03-21 ASSESSMENT — PAIN - FUNCTIONAL ASSESSMENT
PAIN_FUNCTIONAL_ASSESSMENT: 0-10
PAIN_FUNCTIONAL_ASSESSMENT: NONE - DENIES PAIN

## 2024-03-21 ASSESSMENT — PAIN DESCRIPTION - PAIN TYPE: TYPE: ACUTE PAIN

## 2024-03-21 ASSESSMENT — LIFESTYLE VARIABLES
HOW MANY STANDARD DRINKS CONTAINING ALCOHOL DO YOU HAVE ON A TYPICAL DAY: PATIENT DOES NOT DRINK
HOW OFTEN DO YOU HAVE A DRINK CONTAINING ALCOHOL: NEVER

## 2024-03-21 ASSESSMENT — PAIN SCALES - GENERAL: PAINLEVEL_OUTOF10: 3
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
## 2024-05-09 LAB
BUN SERPL-MCNC: 16 MG/DL (ref 8–27)
BUN/CREAT SERPL: 21 (ref 10–24)
CALCIUM SERPL-MCNC: 9.1 MG/DL (ref 8.6–10.2)
CHLORIDE SERPL-SCNC: 104 MMOL/L (ref 96–106)
CO2 SERPL-SCNC: 21 MMOL/L (ref 20–29)
CREAT SERPL-MCNC: 0.76 MG/DL (ref 0.76–1.27)
EGFRCR SERPLBLD CKD-EPI 2021: 98 ML/MIN/1.73
GLUCOSE SERPL-MCNC: 122 MG/DL (ref 70–99)
HBA1C MFR BLD: 6.7 % (ref 4.8–5.6)
LDLC SERPL DIRECT ASSAY-MCNC: 98 MG/DL (ref 0–99)
POTASSIUM SERPL-SCNC: 4.5 MMOL/L (ref 3.5–5.2)
SODIUM SERPL-SCNC: 141 MMOL/L (ref 134–144)

## 2024-05-17 ENCOUNTER — OFFICE VISIT (OUTPATIENT)
Age: 69
End: 2024-05-17

## 2024-05-17 VITALS
HEART RATE: 91 BPM | TEMPERATURE: 97.3 F | SYSTOLIC BLOOD PRESSURE: 120 MMHG | BODY MASS INDEX: 29.98 KG/M2 | OXYGEN SATURATION: 96 % | HEIGHT: 68 IN | DIASTOLIC BLOOD PRESSURE: 52 MMHG | WEIGHT: 197.8 LBS

## 2024-05-17 DIAGNOSIS — E03.9 PRIMARY HYPOTHYROIDISM: ICD-10-CM

## 2024-05-17 DIAGNOSIS — E11.65 TYPE 2 DIABETES MELLITUS WITH HYPERGLYCEMIA, UNSPECIFIED WHETHER LONG TERM INSULIN USE (HCC): Primary | ICD-10-CM

## 2024-05-17 DIAGNOSIS — E78.2 MIXED HYPERLIPIDEMIA: ICD-10-CM

## 2024-05-17 NOTE — PROGRESS NOTES
Hyperlipidemia -    Refused statin - understands the benefits         6 Transaminitis - NAFLD   Vit E   Hepatitis panel negative         Plantar fascitis - had surgery 25 years ago       Thank you for allowing me to participate in the care of this patient.      Lalitha Saleh MD      Patient verbalized understanding      Voice-recognition software was used to generate this report, which may result in some phonetic-based errors in the grammar and contents.  Even though attempts were made to correct all the mistakes,  some may have been missed and remained in the body of the report.

## 2024-09-13 LAB
HBA1C MFR BLD: 6.9 % (ref 4.8–5.6)
TSH SERPL DL<=0.005 MIU/L-ACNC: 0.56 UIU/ML (ref 0.45–4.5)

## 2024-09-20 ENCOUNTER — OFFICE VISIT (OUTPATIENT)
Age: 69
End: 2024-09-20
Payer: MEDICARE

## 2024-09-20 VITALS
HEART RATE: 88 BPM | WEIGHT: 204.2 LBS | BODY MASS INDEX: 30.95 KG/M2 | SYSTOLIC BLOOD PRESSURE: 123 MMHG | HEIGHT: 68 IN | DIASTOLIC BLOOD PRESSURE: 59 MMHG | TEMPERATURE: 98.2 F | OXYGEN SATURATION: 97 %

## 2024-09-20 DIAGNOSIS — I10 ESSENTIAL (PRIMARY) HYPERTENSION: ICD-10-CM

## 2024-09-20 DIAGNOSIS — E11.65 TYPE 2 DIABETES MELLITUS WITH HYPERGLYCEMIA, WITHOUT LONG-TERM CURRENT USE OF INSULIN (HCC): ICD-10-CM

## 2024-09-20 DIAGNOSIS — E03.4 ATROPHY OF THYROID (ACQUIRED): ICD-10-CM

## 2024-09-20 DIAGNOSIS — I10 PRIMARY HYPERTENSION: ICD-10-CM

## 2024-09-20 DIAGNOSIS — E78.2 MIXED HYPERLIPIDEMIA: ICD-10-CM

## 2024-09-20 DIAGNOSIS — E03.9 ACQUIRED HYPOTHYROIDISM: ICD-10-CM

## 2024-09-20 DIAGNOSIS — E11.65 TYPE 2 DIABETES MELLITUS WITH HYPERGLYCEMIA, WITHOUT LONG-TERM CURRENT USE OF INSULIN (HCC): Primary | ICD-10-CM

## 2024-09-20 PROBLEM — K90.0 CELIAC DISEASE: Status: ACTIVE | Noted: 2024-09-20

## 2024-09-20 PROCEDURE — 3074F SYST BP LT 130 MM HG: CPT | Performed by: INTERNAL MEDICINE

## 2024-09-20 PROCEDURE — 3044F HG A1C LEVEL LT 7.0%: CPT | Performed by: INTERNAL MEDICINE

## 2024-09-20 PROCEDURE — 2022F DILAT RTA XM EVC RTNOPTHY: CPT | Performed by: INTERNAL MEDICINE

## 2024-09-20 PROCEDURE — G8417 CALC BMI ABV UP PARAM F/U: HCPCS | Performed by: INTERNAL MEDICINE

## 2024-09-20 PROCEDURE — 3017F COLORECTAL CA SCREEN DOC REV: CPT | Performed by: INTERNAL MEDICINE

## 2024-09-20 PROCEDURE — 99214 OFFICE O/P EST MOD 30 MIN: CPT | Performed by: INTERNAL MEDICINE

## 2024-09-20 PROCEDURE — G2211 COMPLEX E/M VISIT ADD ON: HCPCS | Performed by: INTERNAL MEDICINE

## 2024-09-20 PROCEDURE — G8427 DOCREV CUR MEDS BY ELIG CLIN: HCPCS | Performed by: INTERNAL MEDICINE

## 2024-09-20 PROCEDURE — 1123F ACP DISCUSS/DSCN MKR DOCD: CPT | Performed by: INTERNAL MEDICINE

## 2024-09-20 PROCEDURE — 3078F DIAST BP <80 MM HG: CPT | Performed by: INTERNAL MEDICINE

## 2024-09-20 PROCEDURE — 1036F TOBACCO NON-USER: CPT | Performed by: INTERNAL MEDICINE

## 2024-09-20 RX ORDER — DAPAGLIFLOZIN 10 MG/1
10 TABLET, FILM COATED ORAL DAILY
Qty: 90 TABLET | Refills: 3 | Status: SHIPPED | OUTPATIENT
Start: 2024-09-20

## 2024-09-20 RX ORDER — LANCETS 30 GAUGE
EACH MISCELLANEOUS
Qty: 200 EACH | Refills: 3 | Status: SHIPPED | OUTPATIENT
Start: 2024-09-20

## 2024-09-20 RX ORDER — ISOPROPYL ALCOHOL 70 ML/100ML
SWAB TOPICAL
Qty: 200 EACH | Refills: 3 | Status: SHIPPED | OUTPATIENT
Start: 2024-09-20

## 2024-09-20 RX ORDER — LEVOTHYROXINE SODIUM 200 UG/1
TABLET ORAL
Qty: 90 TABLET | Refills: 3 | Status: SHIPPED | OUTPATIENT
Start: 2024-09-20

## 2024-09-20 RX ORDER — CALCIUM CITRATE/VITAMIN D3 200MG-6.25
TABLET ORAL
Qty: 200 EACH | Refills: 3 | Status: SHIPPED | OUTPATIENT
Start: 2024-09-20

## 2024-09-20 RX ORDER — LISINOPRIL 20 MG/1
20 TABLET ORAL DAILY
Qty: 90 TABLET | Refills: 3 | Status: SHIPPED | OUTPATIENT
Start: 2024-09-20

## 2025-01-10 PROBLEM — M19.90 OSTEOARTHROSIS: Status: ACTIVE | Noted: 2025-01-10

## 2025-01-10 PROBLEM — I25.10 CORONARY ARTERIOSCLEROSIS: Status: ACTIVE | Noted: 2025-01-10

## 2025-01-10 PROBLEM — N52.9 ERECTILE DYSFUNCTION: Status: ACTIVE | Noted: 2025-01-10

## 2025-01-10 PROBLEM — J30.9 ALLERGIC RHINITIS: Status: ACTIVE | Noted: 2025-01-10

## 2025-01-16 LAB
BUN SERPL-MCNC: 24 MG/DL (ref 8–27)
BUN/CREAT SERPL: 30 (ref 10–24)
CALCIUM SERPL-MCNC: 9.2 MG/DL (ref 8.6–10.2)
CHLORIDE SERPL-SCNC: 101 MMOL/L (ref 96–106)
CHOLEST SERPL-MCNC: 188 MG/DL (ref 100–199)
CO2 SERPL-SCNC: 25 MMOL/L (ref 20–29)
CREAT SERPL-MCNC: 0.79 MG/DL (ref 0.76–1.27)
EGFRCR SERPLBLD CKD-EPI 2021: 97 ML/MIN/1.73
GLUCOSE SERPL-MCNC: 152 MG/DL (ref 70–99)
HBA1C MFR BLD: 7.2 % (ref 4.8–5.6)
HDLC SERPL-MCNC: 45 MG/DL
LDLC SERPL CALC-MCNC: 106 MG/DL (ref 0–99)
POTASSIUM SERPL-SCNC: 4.8 MMOL/L (ref 3.5–5.2)
SODIUM SERPL-SCNC: 141 MMOL/L (ref 134–144)
TRIGL SERPL-MCNC: 212 MG/DL (ref 0–149)
VLDLC SERPL CALC-MCNC: 37 MG/DL (ref 5–40)

## 2025-01-17 LAB
ALBUMIN/CREAT UR: 34 MG/G CREAT (ref 0–29)
CREAT UR-MCNC: 68.8 MG/DL
IMP & REVIEW OF LAB RESULTS: NORMAL
Lab: NORMAL
MICROALBUMIN UR-MCNC: 23.4 UG/ML

## 2025-01-21 ENCOUNTER — OFFICE VISIT (OUTPATIENT)
Age: 70
End: 2025-01-21

## 2025-01-21 VITALS
HEIGHT: 68 IN | HEART RATE: 73 BPM | BODY MASS INDEX: 30.87 KG/M2 | WEIGHT: 203.7 LBS | DIASTOLIC BLOOD PRESSURE: 59 MMHG | RESPIRATION RATE: 20 BRPM | TEMPERATURE: 98.7 F | OXYGEN SATURATION: 95 % | SYSTOLIC BLOOD PRESSURE: 118 MMHG

## 2025-01-21 DIAGNOSIS — I10 PRIMARY HYPERTENSION: ICD-10-CM

## 2025-01-21 DIAGNOSIS — E11.65 TYPE 2 DIABETES MELLITUS WITH HYPERGLYCEMIA, WITHOUT LONG-TERM CURRENT USE OF INSULIN (HCC): Primary | ICD-10-CM

## 2025-01-21 DIAGNOSIS — E03.9 ACQUIRED HYPOTHYROIDISM: ICD-10-CM

## 2025-01-21 DIAGNOSIS — E78.2 MIXED HYPERLIPIDEMIA: ICD-10-CM

## 2025-01-21 NOTE — PROGRESS NOTES
[facility] DIABETES AND ENDOCRINOLOGY                 Lalitha Saleh MD              Referred by: Johan Robb MD         The patient (or guardian, if applicable) and other individuals in attendance with the patient were advised that Artificial Intelligence will be utilized during this visit to record, process the conversation to generate a clinical note, and support improvement of the AI technology. The patient (or guardian, if applicable) and other individuals in attendance at the appointment consented to the use of AI, including the recording.         History of Present Illness: Emeka Mckeon is  here  for  Follow-up of  Type 2 Diabetes Mellitus .   Dx 2006    Doing well on Farxiga  Retired June 2024  No yeast infections  Hydrating well   Checking BG twice daily - AM <130, PM <180   No severe hypoglycemia  Few blood glucose numbers in 200s after eating pancakes  No acute issues     No chest pain   Eye exam - August 2023              He was referred by Dr. Robb for the management of type 2 diabetes.     He was seen by Dr. Amos in 2010 for hyperthyroidism, status post radioactive iodine therapy.  He is currently hypothyroid on Levothyroxine replacement.                 Wt Readings from Last 3 Encounters:   01/21/25 92.4 kg (203 lb 11.2 oz)   09/20/24 92.6 kg (204 lb 3.2 oz)   05/17/24 89.7 kg (197 lb 12.8 oz)        Past Medical History:   Diagnosis Date    Diabetes (HCC)     Hyperlipidemia LDL goal < 100     Hyperthyroidism 20 yrs ago    treated with HACKETT    Hypothyroidism     s/p HACKETT       Current Outpatient Medications   Medication Sig    TRUE METRIX BLOOD GLUCOSE TEST strip Used to test BG 2 times daily, dx code E11.65    metFORMIN (GLUCOPHAGE) 1000 MG tablet Take 1 tablet by mouth 2 times daily (with meals)    lisinopril (PRINIVIL;ZESTRIL) 20 MG tablet Take 1 tablet by mouth daily    levothyroxine (SYNTHROID) 200 MCG tablet Takes 1 tablet Mon-Sat, none on Sun    Lancets 
Emeka Mckeon is a 69 y.o. male here for   Chief Complaint   Patient presents with    Diabetes    Thyroid Problem       1. Have you been to the ER, urgent care clinic since your last visit?  Hospitalized since your last visit? - no    2. Have you seen or consulted any other health care providers outside of the [facility] since your last visit?  Include any pap smears or colon screening.- no    
Yes

## 2025-03-12 DIAGNOSIS — E03.4 ATROPHY OF THYROID (ACQUIRED): ICD-10-CM

## 2025-03-12 DIAGNOSIS — E11.65 TYPE 2 DIABETES MELLITUS WITH HYPERGLYCEMIA, WITHOUT LONG-TERM CURRENT USE OF INSULIN (HCC): ICD-10-CM

## 2025-03-12 RX ORDER — GLUCOSAM/CHON-MSM1/C/MANG/BOSW 500-416.6
TABLET ORAL
Qty: 200 EACH | Refills: 3 | Status: SHIPPED | OUTPATIENT
Start: 2025-03-12

## 2025-03-12 RX ORDER — CALCIUM CITRATE/VITAMIN D3 200MG-6.25
TABLET ORAL
Qty: 200 STRIP | Refills: 3 | Status: SHIPPED | OUTPATIENT
Start: 2025-03-12

## 2025-04-25 LAB
ALBUMIN URINE, EXTERNAL: 20.7
CREATININE, URINE, EXTERNAL: 62.1
GFR, ESTIMATED: 94
HBA1C MFR BLD HPLC: 7.6 %
MICROALBUMIN/CREAT UR: 33 MG/G{CREAT}
PSA, EXTERNAL: 0.3

## 2025-05-09 LAB
HBA1C MFR BLD: 7.6 % (ref 4.8–5.6)
TSH SERPL DL<=0.005 MIU/L-ACNC: 0.73 UIU/ML (ref 0.45–4.5)

## 2025-05-10 LAB
ALBUMIN/CREAT UR: 25 MG/G CREAT (ref 0–29)
CREAT UR-MCNC: 78.3 MG/DL
MICROALBUMIN UR-MCNC: 19.2 UG/ML

## 2025-05-16 ENCOUNTER — OFFICE VISIT (OUTPATIENT)
Age: 70
End: 2025-05-16
Payer: MEDICARE

## 2025-05-16 VITALS
OXYGEN SATURATION: 94 % | HEART RATE: 76 BPM | BODY MASS INDEX: 31.74 KG/M2 | SYSTOLIC BLOOD PRESSURE: 108 MMHG | TEMPERATURE: 98.4 F | WEIGHT: 209.4 LBS | DIASTOLIC BLOOD PRESSURE: 53 MMHG | HEIGHT: 68 IN

## 2025-05-16 DIAGNOSIS — E78.2 MIXED HYPERLIPIDEMIA: ICD-10-CM

## 2025-05-16 DIAGNOSIS — I10 PRIMARY HYPERTENSION: ICD-10-CM

## 2025-05-16 DIAGNOSIS — E03.9 ACQUIRED HYPOTHYROIDISM: ICD-10-CM

## 2025-05-16 DIAGNOSIS — E11.65 TYPE 2 DIABETES MELLITUS WITH HYPERGLYCEMIA, WITHOUT LONG-TERM CURRENT USE OF INSULIN (HCC): Primary | ICD-10-CM

## 2025-05-16 PROCEDURE — 3051F HG A1C>EQUAL 7.0%<8.0%: CPT | Performed by: INTERNAL MEDICINE

## 2025-05-16 PROCEDURE — 3074F SYST BP LT 130 MM HG: CPT | Performed by: INTERNAL MEDICINE

## 2025-05-16 PROCEDURE — 2022F DILAT RTA XM EVC RTNOPTHY: CPT | Performed by: INTERNAL MEDICINE

## 2025-05-16 PROCEDURE — G8417 CALC BMI ABV UP PARAM F/U: HCPCS | Performed by: INTERNAL MEDICINE

## 2025-05-16 PROCEDURE — 1160F RVW MEDS BY RX/DR IN RCRD: CPT | Performed by: INTERNAL MEDICINE

## 2025-05-16 PROCEDURE — 3078F DIAST BP <80 MM HG: CPT | Performed by: INTERNAL MEDICINE

## 2025-05-16 PROCEDURE — 1036F TOBACCO NON-USER: CPT | Performed by: INTERNAL MEDICINE

## 2025-05-16 PROCEDURE — 3017F COLORECTAL CA SCREEN DOC REV: CPT | Performed by: INTERNAL MEDICINE

## 2025-05-16 PROCEDURE — 1123F ACP DISCUSS/DSCN MKR DOCD: CPT | Performed by: INTERNAL MEDICINE

## 2025-05-16 PROCEDURE — 99214 OFFICE O/P EST MOD 30 MIN: CPT | Performed by: INTERNAL MEDICINE

## 2025-05-16 PROCEDURE — 1126F AMNT PAIN NOTED NONE PRSNT: CPT | Performed by: INTERNAL MEDICINE

## 2025-05-16 PROCEDURE — G8427 DOCREV CUR MEDS BY ELIG CLIN: HCPCS | Performed by: INTERNAL MEDICINE

## 2025-05-16 PROCEDURE — G2211 COMPLEX E/M VISIT ADD ON: HCPCS | Performed by: INTERNAL MEDICINE

## 2025-05-16 PROCEDURE — 1159F MED LIST DOCD IN RCRD: CPT | Performed by: INTERNAL MEDICINE

## 2025-05-16 NOTE — PROGRESS NOTES
Emeka Mckeon is a 70 y.o. male here for   Chief Complaint   Patient presents with    Diabetes    Thyroid Problem       1. Have you been to the ER, urgent care clinic since your last visit?  Hospitalized since your last visit? - no    2. Have you seen or consulted any other health care providers outside of the [facility] since your last visit?  Include any pap smears or colon screening.- PCP

## 2025-05-16 NOTE — PROGRESS NOTES
[facility] DIABETES AND ENDOCRINOLOGY                 Lalitha Saleh MD              Referred by: Johan Robb MD         The patient (or guardian, if applicable) and other individuals in attendance with the patient were advised that Artificial Intelligence will be utilized during this visit to record, process the conversation to generate a clinical note, and support improvement of the AI technology. The patient (or guardian, if applicable) and other individuals in attendance at the appointment consented to the use of AI, including the recording.         History of Present Illness: Emeka Mckeon is  here  for  Follow-up of  Type 2 Diabetes Mellitus .   Dx 2006    Doing well on Farxiga  Retired June 2024  No yeast infections  Hydrating well   Checking BG twice daily -fasting blood glucose 140-160, A1c is higher  No change in the diet  No severe hypoglycemia     No chest pain      Taking thyroid medication consistently  Had labs at PCPs office,  A1c 7.6             He was referred by Dr. Robb for the management of type 2 diabetes.     He was seen by Dr. Amos in 2010 for hyperthyroidism, status post radioactive iodine therapy.  He is currently hypothyroid on Levothyroxine replacement.                 Wt Readings from Last 3 Encounters:   05/16/25 95 kg (209 lb 6.4 oz)   01/21/25 92.4 kg (203 lb 11.2 oz)   09/20/24 92.6 kg (204 lb 3.2 oz)        Past Medical History:   Diagnosis Date    Diabetes (HCC)     Hyperlipidemia LDL goal < 100     Hyperthyroidism 20 yrs ago    treated with HACKETT    Hypothyroidism     s/p HACKETT       Current Outpatient Medications   Medication Sig    TRUEplus Lancets 33G MISC USE TO TEST BLOOD SUGAR TWO TIMES DAILY. DX CODE E11.65    TRUE METRIX BLOOD GLUCOSE TEST strip USE TO TEST BLOOD SUGAR TWO TIMES DAILY. Dx code E11.65    metFORMIN (GLUCOPHAGE) 1000 MG tablet Take 1 tablet by mouth 2 times daily (with meals)    lisinopril (PRINIVIL;ZESTRIL) 20 MG tablet

## 2025-06-28 DIAGNOSIS — E03.4 ATROPHY OF THYROID (ACQUIRED): ICD-10-CM

## 2025-06-28 DIAGNOSIS — E11.65 TYPE 2 DIABETES MELLITUS WITH HYPERGLYCEMIA, WITHOUT LONG-TERM CURRENT USE OF INSULIN (HCC): ICD-10-CM

## 2025-06-28 DIAGNOSIS — I10 ESSENTIAL (PRIMARY) HYPERTENSION: ICD-10-CM

## 2025-06-30 RX ORDER — LISINOPRIL 20 MG/1
20 TABLET ORAL DAILY
Qty: 90 TABLET | Refills: 3 | Status: SHIPPED | OUTPATIENT
Start: 2025-06-30

## 2025-06-30 RX ORDER — LEVOTHYROXINE SODIUM 200 UG/1
TABLET ORAL
Qty: 78 TABLET | Refills: 3 | Status: SHIPPED | OUTPATIENT
Start: 2025-06-30

## 2025-08-06 DIAGNOSIS — E11.65 TYPE 2 DIABETES MELLITUS WITH HYPERGLYCEMIA, WITHOUT LONG-TERM CURRENT USE OF INSULIN (HCC): ICD-10-CM

## 2025-08-06 RX ORDER — ISOPROPYL ALCOHOL 70 ML/100ML
SWAB TOPICAL
Qty: 200 EACH | Refills: 3 | Status: SHIPPED | OUTPATIENT
Start: 2025-08-06

## 2025-08-13 LAB
ALBUMIN/CREAT UR: 20 MG/G CREAT (ref 0–29)
BUN SERPL-MCNC: 26 MG/DL (ref 8–27)
BUN/CREAT SERPL: 33 (ref 10–24)
CALCIUM SERPL-MCNC: 9.3 MG/DL (ref 8.6–10.2)
CHLORIDE SERPL-SCNC: 102 MMOL/L (ref 96–106)
CO2 SERPL-SCNC: 20 MMOL/L (ref 20–29)
CREAT SERPL-MCNC: 0.8 MG/DL (ref 0.76–1.27)
CREAT UR-MCNC: 56.5 MG/DL
EGFRCR SERPLBLD CKD-EPI 2021: 96 ML/MIN/1.73
GLUCOSE SERPL-MCNC: 129 MG/DL (ref 70–99)
HBA1C MFR BLD: 7 % (ref 4.8–5.6)
MICROALBUMIN UR-MCNC: 11.2 UG/ML
POTASSIUM SERPL-SCNC: 4.8 MMOL/L (ref 3.5–5.2)
SODIUM SERPL-SCNC: 138 MMOL/L (ref 134–144)

## 2025-08-19 ENCOUNTER — OFFICE VISIT (OUTPATIENT)
Age: 70
End: 2025-08-19
Payer: MEDICARE

## 2025-08-19 VITALS
TEMPERATURE: 98.8 F | BODY MASS INDEX: 30.74 KG/M2 | OXYGEN SATURATION: 95 % | SYSTOLIC BLOOD PRESSURE: 122 MMHG | WEIGHT: 202.8 LBS | DIASTOLIC BLOOD PRESSURE: 61 MMHG | HEIGHT: 68 IN | HEART RATE: 86 BPM

## 2025-08-19 DIAGNOSIS — E78.2 MIXED HYPERLIPIDEMIA: ICD-10-CM

## 2025-08-19 DIAGNOSIS — E03.9 ACQUIRED HYPOTHYROIDISM: ICD-10-CM

## 2025-08-19 DIAGNOSIS — E11.65 TYPE 2 DIABETES MELLITUS WITH HYPERGLYCEMIA, WITHOUT LONG-TERM CURRENT USE OF INSULIN (HCC): ICD-10-CM

## 2025-08-19 DIAGNOSIS — E03.4 ATROPHY OF THYROID (ACQUIRED): ICD-10-CM

## 2025-08-19 DIAGNOSIS — E11.65 TYPE 2 DIABETES MELLITUS WITH HYPERGLYCEMIA, WITHOUT LONG-TERM CURRENT USE OF INSULIN (HCC): Primary | ICD-10-CM

## 2025-08-19 DIAGNOSIS — I10 PRIMARY HYPERTENSION: ICD-10-CM

## 2025-08-19 PROCEDURE — 1123F ACP DISCUSS/DSCN MKR DOCD: CPT | Performed by: INTERNAL MEDICINE

## 2025-08-19 PROCEDURE — 3051F HG A1C>EQUAL 7.0%<8.0%: CPT | Performed by: INTERNAL MEDICINE

## 2025-08-19 PROCEDURE — G8417 CALC BMI ABV UP PARAM F/U: HCPCS | Performed by: INTERNAL MEDICINE

## 2025-08-19 PROCEDURE — G2211 COMPLEX E/M VISIT ADD ON: HCPCS | Performed by: INTERNAL MEDICINE

## 2025-08-19 PROCEDURE — 3017F COLORECTAL CA SCREEN DOC REV: CPT | Performed by: INTERNAL MEDICINE

## 2025-08-19 PROCEDURE — 1126F AMNT PAIN NOTED NONE PRSNT: CPT | Performed by: INTERNAL MEDICINE

## 2025-08-19 PROCEDURE — 3078F DIAST BP <80 MM HG: CPT | Performed by: INTERNAL MEDICINE

## 2025-08-19 PROCEDURE — 1160F RVW MEDS BY RX/DR IN RCRD: CPT | Performed by: INTERNAL MEDICINE

## 2025-08-19 PROCEDURE — 1159F MED LIST DOCD IN RCRD: CPT | Performed by: INTERNAL MEDICINE

## 2025-08-19 PROCEDURE — 3074F SYST BP LT 130 MM HG: CPT | Performed by: INTERNAL MEDICINE

## 2025-08-19 PROCEDURE — G8427 DOCREV CUR MEDS BY ELIG CLIN: HCPCS | Performed by: INTERNAL MEDICINE

## 2025-08-19 PROCEDURE — 1036F TOBACCO NON-USER: CPT | Performed by: INTERNAL MEDICINE

## 2025-08-19 PROCEDURE — 99214 OFFICE O/P EST MOD 30 MIN: CPT | Performed by: INTERNAL MEDICINE

## 2025-08-19 PROCEDURE — 2022F DILAT RTA XM EVC RTNOPTHY: CPT | Performed by: INTERNAL MEDICINE

## 2025-08-19 RX ORDER — DAPAGLIFLOZIN 10 MG/1
10 TABLET, FILM COATED ORAL DAILY
Qty: 90 TABLET | Refills: 3 | Status: ACTIVE | OUTPATIENT
Start: 2025-08-19